# Patient Record
Sex: FEMALE | Race: WHITE | NOT HISPANIC OR LATINO | Employment: FULL TIME
[De-identification: names, ages, dates, MRNs, and addresses within clinical notes are randomized per-mention and may not be internally consistent; named-entity substitution may affect disease eponyms.]

---

## 2017-07-08 ENCOUNTER — HEALTH MAINTENANCE LETTER (OUTPATIENT)
Age: 42
End: 2017-07-08

## 2018-03-07 ENCOUNTER — TELEPHONE (OUTPATIENT)
Dept: OTHER | Facility: CLINIC | Age: 43
End: 2018-03-07

## 2018-03-22 NOTE — TELEPHONE ENCOUNTER
3/22/2018    Call Regarding Onboarding [employer] [facility]    Attempt 2    Message on voicemail     Comments: 0 DEP      Outreach   CC

## 2018-08-09 NOTE — TELEPHONE ENCOUNTER
8/9/2018    Call Regarding Onboarding [employer] [facility]    Attempt 3    Message on voicemail     Comments: 0 DEP      Outreach   CC

## 2020-02-10 ENCOUNTER — HEALTH MAINTENANCE LETTER (OUTPATIENT)
Age: 45
End: 2020-02-10

## 2020-05-04 ENCOUNTER — APPOINTMENT (OUTPATIENT)
Dept: LAB | Facility: CLINIC | Age: 45
End: 2020-05-04
Payer: COMMERCIAL

## 2020-05-04 ENCOUNTER — RESULTS ONLY (OUTPATIENT)
Dept: LAB | Age: 45
End: 2020-05-04

## 2020-05-05 LAB
COVID-19 SPIKE RBD ABY TITER: NORMAL
COVID-19 SPIKE RBD ABY: NEGATIVE

## 2020-11-14 ENCOUNTER — HEALTH MAINTENANCE LETTER (OUTPATIENT)
Age: 45
End: 2020-11-14

## 2021-03-28 ENCOUNTER — HEALTH MAINTENANCE LETTER (OUTPATIENT)
Age: 46
End: 2021-03-28

## 2021-09-12 ENCOUNTER — HEALTH MAINTENANCE LETTER (OUTPATIENT)
Age: 46
End: 2021-09-12

## 2021-12-13 ENCOUNTER — OFFICE VISIT (OUTPATIENT)
Dept: ORTHOPEDICS | Facility: CLINIC | Age: 46
End: 2021-12-13
Payer: COMMERCIAL

## 2021-12-13 ENCOUNTER — ANCILLARY PROCEDURE (OUTPATIENT)
Dept: GENERAL RADIOLOGY | Facility: CLINIC | Age: 46
End: 2021-12-13
Attending: FAMILY MEDICINE
Payer: COMMERCIAL

## 2021-12-13 DIAGNOSIS — M25.562 LEFT KNEE PAIN: Primary | ICD-10-CM

## 2021-12-13 DIAGNOSIS — M17.12 PRIMARY OSTEOARTHRITIS OF LEFT KNEE: Primary | ICD-10-CM

## 2021-12-13 PROCEDURE — 99203 OFFICE O/P NEW LOW 30 MIN: CPT | Performed by: FAMILY MEDICINE

## 2021-12-13 PROCEDURE — 73562 X-RAY EXAM OF KNEE 3: CPT | Mod: LT | Performed by: RADIOLOGY

## 2021-12-13 RX ORDER — DICLOFENAC SODIUM 75 MG/1
75 TABLET, DELAYED RELEASE ORAL 2 TIMES DAILY
Qty: 28 TABLET | Refills: 1 | Status: SHIPPED | OUTPATIENT
Start: 2021-12-13 | End: 2021-12-27

## 2021-12-13 NOTE — PATIENT INSTRUCTIONS
Use knee brace as needed for comfort  Take diclofenac up to twice daily with food as needed for pain  Avoid deep knee flexion exercises otherwise activity as tolerated  Perform home exercises as you are able.  Contact us if you would like to follow-up with physical therapy.    Leonidas Moss MD  [facility]  [address] [place] [id number]    Phone: [phone]  Fax: [phone]               Chondromalacia / Patellofemoral Pain    CHONDROMALACIA PATELLAE:  -Pain in the front of your knee due to increased pressure from the knee cap (patella)  -There are many causes including trauma, history of dislocation or subluxation of knee cap but most often it is due to an imbalance of the thigh muscles or weak core muscles which cause irregular tracking of your kneecap on your thigh bone.  -People whose feet pronate (roll in) increase the outward pulling on the kneecap as well    SIGNS AND SYMPTOMS:  -Diffuse knee pain that worsens with stairs, squatting, prolonged sitting, jumping, and similar movements.  -Pain may be achy or sharp  -Stiffness with prolonged sitting  -Occasionally, giving way of the knee, grinding or a catching sensation    TREATMENT:  -regular exercise (biking, swimming, or elliptical are good for cardio)  -weight lifting/plyometrics are helpful but remember to keep your knees behind your toes (don't bend knee more than 90degrees)  -regular core exercises (yoga and pilates)  -arch supports may help during exercise until hips stronger to prevent ankle pronation  *over the counter semi-rigid brands include power step arch supports may be purchased at specialty shoe stores, [employer] or [employer]  *custom made orthotics may be ordered by your physician if needed for prolonged treatment  -Stretching and strengthening therapy  -Ice 10-15 minutes after activity. (Ice cups for massage 5-7min)  -Ice and NSAIDs help decrease inflammation. A good  anti-inflammatory NSAID medication is Alleve (220mg). Take 1-2 tabs twice daily with food until pain improves or minimum of 14 days, then as needed for pain. (1-2 tabs twice daily dosing is for patients over 12 years old. Angy than 11 yo, check dose with doctor.)  -often component of hip weakness that leads to lower extremity (foot, ankle, shin, and knee) problems so a lot of focus will be on core strength and balance  - recommend yoga for core strengthening and stretching  -Perform exercises as instructed through handout or formal therapy if doing. Until then start with the following:  -Ankle strengthening  1) balance on one foot 1-2 min daily  2) once able to balance for 1 minute, start hip strengthening with 4 way motion with straight leg. Tie theraband around ankle and balance on other foot while doing15 reps each direction of straight leg  motion  3) arch raises- tighten bottom of foot and hold x 3-5 sec, repeat 5 times  4) ankle exercises (4 way with theraband)- 3 sets of 10-15 (fatigue) daily  -usually takes several weeks before pain free but longer before return to full activity without pain  --Once released to increase activity, BE PATIENT!    Return to activity guidelines include:    If it hurts, please avoid activity    Start gradually and build up, wait 24 hours before increase intensity and time    Runnin min on treadmill (or somewhere you can get home easily from if you have to stop), start walk 4 min/run 1 min and Repeat 3 times. If pain free for 48 hours, increase to walk 3 min/run 2 min. Continue to increase as such as pain allows. If you develop pain, back off to previous pain-free level and wait 1 week before increasing again.    Follow-up in 6 weeks if not improved or sooner if further concern.    If problem flares again after resolved, restart icing, and exercises.      Standing hamstring stretch: Put the heel of the leg on your injured side on a stool about 15 inches high. Keep your leg  straight. Lean forward, bending at the hips, until you feel a mild stretch in the back of your thigh. Make sure you don't roll your shoulders or bend at the waist when doing this or you will stretch your lower back instead of your leg. Hold the stretch for 15 to 30 seconds. Repeat 3 times.    Quadriceps stretch: Stand at an arm's length away from the wall with your injured side farthest from the wall. Facing straight ahead, brace yourself by keeping one hand against the wall. With your other hand, grasp the ankle on your injured side and pull your heel toward your buttocks. Don't arch or twist your back. Keep your knees together. Hold this stretch for 15 to 30 seconds.    Side-lying leg lift: Lie on your uninjured side. Tighten the front thigh muscles on your injured leg and lift that leg 8 to 10 inches (20 to 25 centimeters) away from the other leg. Keep the leg straight and lower it slowly. Do 2 sets of 15.    Quad sets: Sit on the floor with your injured leg straight and your other leg bent. Press the back of the knee of your injured leg against the floor by tightening the muscles on the top of your thigh. Hold this position 10 seconds. Relax. Do 2 sets of 15.  Straight leg raise: Lie on your back with your legs straight out in front of you. Bend the knee on your uninjured side and place the foot flat on the floor. Tighten the thigh muscle on your injured side and lift your leg about 8 inches off the floor. Keep your leg straight and your thigh muscle tight. Slowly lower your leg back down to the floor. Do 2 sets of 15.    Clam exercise: Lie on your uninjured side with your hips and knees bent and feet together. Slowly raise your top leg toward the ceiling while keeping your heels touching each other. Hold for 2 seconds and lower slowly. Do 2 sets of 15 repetitions.    Wall squat with a ball: Stand with your back, shoulders, and head against a wall. Look straight ahead. Keep your shoulders relaxed and your feet 3  feet (90 centimeters) from the wall and shoulder's width apart. Place a soccer or basketball-sized ball behind your back. Keeping your back against the wall, slowly squat down to a 45-degree angle. Your thighs will not yet be parallel to the floor. Hold this position for 10 seconds and then slowly slide back up the wall. Repeat 10 times. Build up to 2 sets of 15.    Knee stabilization: Wrap a piece of elastic tubing around the ankle of your uninjured leg. Tie a knot in the other end of the tubing and close it in a door at about ankle height.  Stand facing the door on the leg without tubing (your injured leg) and bend your knee slightly, keeping your thigh muscles tight. Stay in this position while you move the leg with the tubing (the uninjured leg) straight back behind you. Do 2 sets of 15.  Turn 90 degrees so the leg without tubing is closest to the door. Move the leg with tubing away from your body. Do 2 sets of 15.  Turn 90 degrees again so your back is to the door. Move the leg with tubing straight out in front of you. Do 2 sets of 15.  Turn your body 90 degrees again so the leg with tubing is closest to the door. Move the leg with tubing across your body. Do 2 sets of 15.  Hold onto a chair if you need help balancing. This exercise can be made more challenging by standing on a firm pillow or foam mat while you move the leg with tubing.    Resisted terminal knee extension: Make a loop with a piece of elastic tubing by tying a knot in both ends. Close the knot in a door at knee height. Step into the loop with your injured leg so the tubing is around the back of your knee. Lift the other foot off the ground and hold onto a chair for balance, if needed. Bend the knee with tubing about 45 degrees. Slowly straighten your leg, keeping your thigh muscle tight as you do this. Repeat 15 times. Do 2 sets of 15. If you need an easier way to do this, stand on both legs for better support while you do the  exercise.    Standing calf stretch: Stand facing a wall with your hands on the wall at about eye level. Keep your injured leg back with your heel on the floor. Keep the other leg forward with the knee bent. Turn your back foot slightly inward (as if you were pigeon-toed). Slowly lean into the wall until you feel a stretch in the back of your calf. Hold the stretch for 15 to 30 seconds. Return to the starting position. Repeat 3 times. Do this exercise several times each day.    Step-up: Stand with the foot of your injured leg on a support 3 to 5 inches (8 to 13 centimeters) high --like a small step or block of wood. Keep your other foot flat on the floor. Shift your weight onto the injured leg on the support. Straighten your injured leg as the other leg comes off the floor. Return to the starting position by bending your injured leg and slowly lowering your uninjured leg back to the floor. Do 2 sets of 15.    Iliotibial band stretch, side-bending: Cross one leg in front of the other leg and lean in the opposite direction from the front leg. Reach the arm on the side of the back leg over your head while you do this. Hold this position for 15 to 30 seconds. Return to the starting position. Repeat 3 times and then switch legs and repeat the exercise.  Developed by [employer].  Published by [employer].  Copyright  2014 [employer] and/or one of its subsidiaries. All rights reserved.

## 2021-12-13 NOTE — LETTER
12/13/2021      RE: Alma Delia Hsieh  [address] [facility] [address]  [facility] [address]         [facility]  SPORTS & ORTHOPEDIC CLINIC VISIT     Dec 13, 2021        ASSESSMENT & PLAN    46-year-old with patellofemoral pain secondary to osteoarthritis of the patellofemoral joint and lateral patellar subluxation.    Reviewed imaging and assessment with patient in detail  Use knee brace as needed for comfort  Take diclofenac up to twice daily with food as needed for pain  Avoid deep knee flexion exercises otherwise activity as tolerated  Perform home exercises as you are able.  Contact us if you would like to follow-up with physical therapy.  May consider steroid injection if pain fails to improve.    Leonidas Moss MD  [facility] [place]    -----  Chief Complaint   Patient presents with     Left Knee - Pain       SUBJECTIVE  Alma Delia Hsieh is a/an 46 year old female who is seen as a self referral for evaluation of  Left knee pain.     The patient is seen by themselves.  The patient is Right handed    Onset: 12/10/21. Patient describes injury as tripping over a rug and landed on her knees and landed on carpet. When falling she felt a rush of liquid in her knee and now has an audible pop.   Location of Pain: left lateral knee  Worsened by: Going down the stairs   Better with: Ice, Ibuprofen   Treatments tried: ice and ibuprofen and knee brace   Associated symptoms: swelling and weakness    Orthopedic/Surgical history: NO  Social History/Occupation: Works in cystic fibrosis       REVIEW OF SYSTEMS:    Do you have fever, chills, weight loss? No    Do you have any vision problems? No    Do you have any chest pain or edema? No    Do you have any shortness of breath or wheezing?  No    Do you have stomach problems? No    Do you have any numbness or focal weakness? No    Do you have diabetes? No    Do you have problems with bleeding or clotting? No    Do you  have an rashes or other skin lesions? No    OBJECTIVE:  There were no vitals taken for this visit.     Patient is alert, No acute distress, pleasant and conversational.    Gait: nonantalgic. Normal heel toe gait.    left knee:   Skin intact. No erythema or ecchymosis.  Mild effusion.  No soft tissue swelling    AROM: Zero to approximately 135  with crepitus noted    Palpation: No medial or lateral facet joint tenderness.  No posterior medial or posterior lateral joint line tenderness     Special Tests:  Negative bounce test, negative forced flexion and negative Real's.  No ligamentous laxity or pain with valgus or varus stress.  Negative Lachman's, Anterior Drawer and Posterior Drawer     Full Isometric quad strength, extensor mechanism in place     Neurovascularly intact in the lower extremity    Hip and Ankle with full AROM and nontender      RADIOLOGY:    Three-view x-rays of the left knee performed today and reviewed independently demonstrating no acute fracture or dislocation.  Minimal narrowing of the medial or lateral compartments.  Moderate narrowing of the lateral patellofemoral compartment with osteophytes and lateral subluxation of the patella.  See EMR for formal radiology report.         Leonidas Moss MD

## 2021-12-13 NOTE — PROGRESS NOTES
[facility] SUR[facility]ERY C[facility]  SPORTS & ORTHOPEDIC CLINIC VISIT     Dec 13, 2021        ASSESSMENT & PLAN    46-year-old with patellofemoral pain secondary to osteoarthritis of the patellofemoral joint and lateral patellar subluxation.    Reviewed imaging and assessment with patient in detail  Use knee brace as needed for comfort  Take diclofenac up to twice daily with food as needed for pain  Avoid deep knee flexion exercises otherwise activity as tolerated  Perform home exercises as you are able.  Contact us if you would like to follow-up with physical therapy.  May consider steroid injection if pain fails to improve.    Leonidas Moss MD  [facility]    -----  Chief Complaint   Patient presents with     Left Knee - Pain       SUBJECTIVE  Alma Delia Hsieh is a/an 46 year old female who is seen as a self referral for evaluation of  Left knee pain.     The patient is seen by themselves.  The patient is Right handed    Onset: 12/10/21. Patient describes injury as tripping over a rug and landed on her knees and landed on carpet. When falling she felt a rush of liquid in her knee and now has an audible pop.   Location of Pain: left lateral knee  Worsened by: Going down the stairs   Better with: Ice, Ibuprofen   Treatments tried: ice and ibuprofen and knee brace   Associated symptoms: swelling and weakness    Orthopedic/Surgical history: NO  Social History/Occupation: Works in cystic fibrosis       REVIEW OF SYSTEMS:    Do you have fever, chills, weight loss? No    Do you have any vision problems? No    Do you have any chest pain or edema? No    Do you have any shortness of breath or wheezing?  No    Do you have stomach problems? No    Do you have any numbness or focal weakness? No    Do you have diabetes? No    Do you have problems with bleeding or clotting? No    Do you have an rashes or other skin lesions? No    OBJECTIVE:  There were no vitals taken for this visit.      Patient is alert, No acute distress, pleasant and conversational.    Gait: nonantalgic. Normal heel toe gait.    left knee:   Skin intact. No erythema or ecchymosis.  Mild effusion.  No soft tissue swelling    AROM: Zero to approximately 135  with crepitus noted    Palpation: No medial or lateral facet joint tenderness.  No posterior medial or posterior lateral joint line tenderness     Special Tests:  Negative bounce test, negative forced flexion and negative Real's.  No ligamentous laxity or pain with valgus or varus stress.  Negative Lachman's, Anterior Drawer and Posterior Drawer     Full Isometric quad strength, extensor mechanism in place     Neurovascularly intact in the lower extremity    Hip and Ankle with full AROM and nontender      RADIOLOGY:    Three-view x-rays of the left knee performed today and reviewed independently demonstrating no acute fracture or dislocation.  Minimal narrowing of the medial or lateral compartments.  Moderate narrowing of the lateral patellofemoral compartment with osteophytes and lateral subluxation of the patella.  See EMR for formal radiology report.

## 2022-01-03 ENCOUNTER — ANCILLARY PROCEDURE (OUTPATIENT)
Dept: GENERAL RADIOLOGY | Facility: CLINIC | Age: 47
End: 2022-01-03
Attending: FAMILY MEDICINE
Payer: COMMERCIAL

## 2022-01-03 ENCOUNTER — OFFICE VISIT (OUTPATIENT)
Dept: ORTHOPEDICS | Facility: CLINIC | Age: 47
End: 2022-01-03
Payer: COMMERCIAL

## 2022-01-03 DIAGNOSIS — G89.29 CHRONIC PAIN OF RIGHT KNEE: ICD-10-CM

## 2022-01-03 DIAGNOSIS — M25.561 CHRONIC PAIN OF RIGHT KNEE: ICD-10-CM

## 2022-01-03 DIAGNOSIS — M25.562 ACUTE PAIN OF LEFT KNEE: Primary | ICD-10-CM

## 2022-01-03 PROCEDURE — 73562 X-RAY EXAM OF KNEE 3: CPT | Mod: LT | Performed by: RADIOLOGY

## 2022-01-03 PROCEDURE — 99213 OFFICE O/P EST LOW 20 MIN: CPT | Mod: 25 | Performed by: FAMILY MEDICINE

## 2022-01-03 PROCEDURE — 20610 DRAIN/INJ JOINT/BURSA W/O US: CPT | Mod: LT | Performed by: FAMILY MEDICINE

## 2022-01-03 PROCEDURE — 73562 X-RAY EXAM OF KNEE 3: CPT | Mod: RT | Performed by: RADIOLOGY

## 2022-01-03 RX ADMIN — TRIAMCINOLONE ACETONIDE 40 MG: 40 INJECTION, SUSPENSION INTRA-ARTICULAR; INTRAMUSCULAR at 11:04

## 2022-01-03 RX ADMIN — LIDOCAINE HYDROCHLORIDE 4 ML: 10 INJECTION, SOLUTION EPIDURAL; INFILTRATION; INTRACAUDAL; PERINEURAL at 11:04

## 2022-01-03 NOTE — NURSING NOTE
[address] [address]  Dept: [phone]  ______________________________________________________________________________    Patient: Alma Delia Hsieh   : 1975   MRN: [id number]   January 3, 2022    INVASIVE PROCEDURE SAFETY CHECKLIST    Date: January 3, 2022   Procedure:L knee CSI   Patient Name: Alma Delia Hsieh  MRN: [id number]  YOB: 1975    Action: Complete sections as appropriate. Any discrepancy results in a HARD COPY until resolved.     PRE PROCEDURE:  Patient ID verified with 2 identifiers (name and  or MRN): Yes  Procedure and site verified with patient/designee (when able): Yes  Accurate consent documentation in medical record: Yes  H&P (or appropriate assessment) documented in medical record: Yes  H&P must be up to 20 days prior to procedure and updates within 24 hours of procedure as applicable: NA  Relevant diagnostic and radiology test results appropriately labeled and displayed as applicable: Yes  Procedure site(s) marked with provider initials: NA    TIMEOUT:  Time-Out performed immediately prior to starting procedure, including verbal and active participation of all team members addressing the following:Yes  * Correct patient identify  * Confirmed that the correct side and site are marked  * An accurate procedure consent form  * Agreement on the procedure to be done  * Correct patient position  * Relevant images and results are properly labeled and appropriately displayed  * The need to administer antibiotics or fluids for irrigation purposes during the procedure as applicable   * Safety precautions based on patient history or medication use    DURING PROCEDURE: Verification of correct person, site, and procedures any time the responsibility for care of the patient is transferred to another member of the care team.       Prior to injection, verified patient identity using patient's name and date of birth.  Due  to injection administration, patient instructed to remain in clinic for 15 minutes  afterwards, and to report any adverse reaction to me immediately.    Joint injection was performed.      Drug Amount Wasted:  Yes: 1 mg/ml   Vial/Syringe: Single dose vial  Expiration Date:  9/1/25      Barbi Tellez ATC  January 3, 2022

## 2022-01-03 NOTE — LETTER
1/3/2022      RE: Alma Delia Hsieh  [address]  [facility] [address]         [facility]  SPORTS & ORTHOPEDIC CLINIC VISIT     Marquise 3, 2022        ASSESSMENT & PLAN    45 yo with suspected loose body in left knee. Mild djd of right knee    Reviewed imaging and assessment with patient in detail  Discussed options for treatment. Since sx improving some, plan to try steroid injection. See note for details  If no improvement or if it continues to recur, would recommended surgical eval  Has also had chronic pain in right knee and demonstrated OA in medial right knee  Reviewed tx including topical and prn nsaids, bracing, PT, injections    Leonidas Moss MD  [facility]TS [facility]IC[facility] [place]    Face-to-face time:15 minutes  Record review time: 3Minutes  Documentation time: 2Minutes  Total time: 20 Minutes independent of procedure on day of visit    -----  Chief Complaint   Patient presents with     Left Knee - Follow Up       SUBJECTIVE  Alma Delia Hsieh is a/an 46 year old female who is seen for follow up of left knee pain. On New Years Day was standing in the kitchen and took a step back and heard and felt a crunching noise. Was unable to WB, fainted from the pain. Most of the pain is on the lateral knee.     The patient is seen by themselves.    Date of injury: 12/10/21  Date of Last Visit: 12/13/21   Symptoms: NA  Worsened by: Walking,   Better with: Flexion,   Treatment to date: ice, casting/splinting/bracing and diclofenac   Associated symptoms: swelling. Locking/catching        REVIEW OF SYSTEMS:    See HPI     OBJECTIVE:  There were no vitals taken for this visit.     Patient is alert, No acute distress, pleasant and conversational.    Gait: antalgic    left knee:   Skin intact. No erythema or ecchymosis.  + effusion no soft tissue swelling.    AROM: Zero to approximately 135  pain with terminal extension    Palpation: ttp over the lateral facet and  anterior lateral joint line  No ttp over medial joint line    Special Tests:    No ligamentous laxity or pain with valgus or varus stress.  Negative Lachman's, Anterior Drawer and Posterior Drawer     Full Isometric quad strength, extensor mechanism in place     Neurovascularly intact in the lower extremity    Hip and Ankle with full AROM and nontender      RADIOLOGY:    3 view xrays of left knee performed and reviewed independently demonstrating suspected loose body lateral to the patella seen on sunrise view. See EMR for formal radiology report.          Large Joint Injection/Arthocentesis: L knee joint    Date/Time: 1/3/2022 11:04 AM  Performed by: Leonidas Moss MD  Authorized by: Leonidas Moss MD     Indications:  Pain and osteoarthritis  Needle Size:  22 G  Guidance: landmark guided    Approach:  Anterolateral  Location:  Knee      Medications:  40 mg triamcinolone 40 MG/ML; 4 mL lidocaine (PF) 1 %  Outcome:  Tolerated well, no immediate complications  Procedure discussed: discussed risks, benefits, and alternatives    Consent Given by:  Patient  Timeout: timeout called immediately prior to procedure    Prep: patient was prepped and draped in usual sterile fashion       There were no complications. The patient tolerated the procedure well. There was minimal bleeding.   The patient was instructed to ice the knee upon leaving clinic and refrain from overuse over the next 2 days.   The patient was instructed to go to the emergency room with any unusual pain, swelling, or redness occurred in the injected area.     Leonidas Moss MD

## 2022-01-03 NOTE — PROGRESS NOTES
[facility]  SPORTS & ORTHOPEDIC CLINIC VISIT     Marquise 3, 2022        ASSESSMENT & PLAN    47 yo with suspected loose body in left knee. Mild djd of right knee    Reviewed imaging and assessment with patient in detail  Discussed options for treatment. Since sx improving some, plan to try steroid injection. See note for details  If no improvement or if it continues to recur, would recommended surgical eval  Has also had chronic pain in right knee and demonstrated OA in medial right knee  Reviewed tx including topical and prn nsaids, bracing, PT, injections    Leonidas Moss MD  [facility] SPOR[facility]IC[facility] CLINIC [place]    Face-to-face time:15 minutes  Record review time: 3Minutes  Documentation time: 2Minutes  Total time: 20 Minutes independent of procedure on day of visit    -----  Chief Complaint   Patient presents with     Left Knee - Follow Up       SUBJECTIVE  Alma Delia Hsieh is a/an 46 year old female who is seen for follow up of left knee pain. On New Years Day was standing in the kitchen and took a step back and heard and felt a crunching noise. Was unable to WB, fainted from the pain. Most of the pain is on the lateral knee.     The patient is seen by themselves.    Date of injury: 12/10/21  Date of Last Visit: 12/13/21   Symptoms: NA  Worsened by: Walking,   Better with: Flexion,   Treatment to date: ice, casting/splinting/bracing and diclofenac   Associated symptoms: swelling. Locking/catching        REVIEW OF SYSTEMS:    See HPI     OBJECTIVE:  There were no vitals taken for this visit.     Patient is alert, No acute distress, pleasant and conversational.    Gait: antalgic    left knee:   Skin intact. No erythema or ecchymosis.  + effusion no soft tissue swelling.    AROM: Zero to approximately 135  pain with terminal extension    Palpation: ttp over the lateral facet and anterior lateral joint line  No ttp over medial joint line    Special Tests:    No ligamentous  laxity or pain with valgus or varus stress.  Negative Lachman's, Anterior Drawer and Posterior Drawer     Full Isometric quad strength, extensor mechanism in place     Neurovascularly intact in the lower extremity    Hip and Ankle with full AROM and nontender      RADIOLOGY:    3 view xrays of left knee performed and reviewed independently demonstrating suspected loose body lateral to the patella seen on sunrise view. See EMR for formal radiology report.          Large Joint Injection/Arthocentesis: L knee joint    Date/Time: 1/3/2022 11:04 AM  Performed by: Leonidas Moss MD  Authorized by: Leonidas Moss MD     Indications:  Pain and osteoarthritis  Needle Size:  22 G  Guidance: landmark guided    Approach:  Anterolateral  Location:  Knee      Medications:  40 mg triamcinolone 40 MG/ML; 4 mL lidocaine (PF) 1 %  Outcome:  Tolerated well, no immediate complications  Procedure discussed: discussed risks, benefits, and alternatives    Consent Given by:  Patient  Timeout: timeout called immediately prior to procedure    Prep: patient was prepped and draped in usual sterile fashion       There were no complications. The patient tolerated the procedure well. There was minimal bleeding.   The patient was instructed to ice the knee upon leaving clinic and refrain from overuse over the next 2 days.   The patient was instructed to go to the emergency room with any unusual pain, swelling, or redness occurred in the injected area.     Leonidas Moss MD

## 2022-01-09 RX ORDER — TRIAMCINOLONE ACETONIDE 40 MG/ML
40 INJECTION, SUSPENSION INTRA-ARTICULAR; INTRAMUSCULAR
Status: DISCONTINUED | OUTPATIENT
Start: 2022-01-03 | End: 2024-10-01

## 2022-01-09 RX ORDER — LIDOCAINE HYDROCHLORIDE 10 MG/ML
4 INJECTION, SOLUTION EPIDURAL; INFILTRATION; INTRACAUDAL; PERINEURAL
Status: DISCONTINUED | OUTPATIENT
Start: 2022-01-03 | End: 2024-10-01

## 2022-04-24 ENCOUNTER — HEALTH MAINTENANCE LETTER (OUTPATIENT)
Age: 47
End: 2022-04-24

## 2022-11-19 ENCOUNTER — HEALTH MAINTENANCE LETTER (OUTPATIENT)
Age: 47
End: 2022-11-19

## 2023-06-01 ENCOUNTER — HEALTH MAINTENANCE LETTER (OUTPATIENT)
Age: 48
End: 2023-06-01

## 2024-01-27 ENCOUNTER — HEALTH MAINTENANCE LETTER (OUTPATIENT)
Age: 49
End: 2024-01-27

## 2024-03-08 ENCOUNTER — ANCILLARY PROCEDURE (OUTPATIENT)
Dept: MAMMOGRAPHY | Facility: CLINIC | Age: 49
End: 2024-03-08
Attending: PHYSICIAN ASSISTANT
Payer: COMMERCIAL

## 2024-03-08 DIAGNOSIS — Z12.31 VISIT FOR SCREENING MAMMOGRAM: ICD-10-CM

## 2024-03-08 PROCEDURE — 77063 BREAST TOMOSYNTHESIS BI: CPT | Mod: GC | Performed by: RADIOLOGY

## 2024-03-08 PROCEDURE — 77067 SCR MAMMO BI INCL CAD: CPT | Mod: GC | Performed by: RADIOLOGY

## 2024-03-22 ENCOUNTER — OFFICE VISIT (OUTPATIENT)
Dept: FAMILY MEDICINE | Facility: CLINIC | Age: 49
End: 2024-03-22
Payer: COMMERCIAL

## 2024-03-22 VITALS
OXYGEN SATURATION: 100 % | HEART RATE: 78 BPM | SYSTOLIC BLOOD PRESSURE: 117 MMHG | HEIGHT: 69 IN | RESPIRATION RATE: 16 BRPM | WEIGHT: 161 LBS | TEMPERATURE: 98.4 F | DIASTOLIC BLOOD PRESSURE: 72 MMHG | BODY MASS INDEX: 23.85 KG/M2

## 2024-03-22 DIAGNOSIS — F17.210 CIGARETTE SMOKER: Primary | ICD-10-CM

## 2024-03-22 DIAGNOSIS — Z13.29 SCREENING FOR THYROID DISORDER: ICD-10-CM

## 2024-03-22 DIAGNOSIS — R79.89 LOW VITAMIN D LEVEL: ICD-10-CM

## 2024-03-22 DIAGNOSIS — Z13.1 SCREENING FOR DIABETES MELLITUS: ICD-10-CM

## 2024-03-22 DIAGNOSIS — Z13.0 SCREENING FOR DEFICIENCY ANEMIA: ICD-10-CM

## 2024-03-22 DIAGNOSIS — H57.02 ANISOCORIA: ICD-10-CM

## 2024-03-22 DIAGNOSIS — Z23 NEED FOR VACCINATION AGAINST HEPATITIS B VIRUS: ICD-10-CM

## 2024-03-22 DIAGNOSIS — Z13.220 LIPID SCREENING: ICD-10-CM

## 2024-03-22 DIAGNOSIS — E56.9 VITAMIN DEFICIENCY: ICD-10-CM

## 2024-03-22 PROCEDURE — 99204 OFFICE O/P NEW MOD 45 MIN: CPT | Performed by: FAMILY MEDICINE

## 2024-03-22 RX ORDER — VARENICLINE TARTRATE 1 MG/1
1 TABLET, FILM COATED ORAL 2 TIMES DAILY
Qty: 60 TABLET | Refills: 5 | Status: SHIPPED | OUTPATIENT
Start: 2024-04-20

## 2024-03-22 RX ORDER — VARENICLINE TARTRATE 0.5 (11)-1
KIT ORAL
Qty: 53 TABLET | Refills: 0 | Status: SHIPPED | OUTPATIENT
Start: 2024-03-22 | End: 2024-10-01

## 2024-03-22 RX ORDER — TIRZEPATIDE 10 MG/.5ML
7.5 INJECTION, SOLUTION SUBCUTANEOUS
COMMUNITY
Start: 2024-03-19

## 2024-03-22 ASSESSMENT — PAIN SCALES - GENERAL: PAINLEVEL: NO PAIN (0)

## 2024-03-22 NOTE — PROGRESS NOTES
Assessment/Plan.    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  I think ophthalmology consultation is a good next step.  Patient has already scheduled this.    Concern for carotid dissection.  Intensity and timing of neck discomfort would be atypical for this.  Reviewed with patient that given that potential Dereck syndrome symptoms are lifelong rather than new, carotid dissection seems unlikely.    Cigarette smoker.  Patient interested in restarting Chantix.  Chantix prescribed.  Encouraged patient to continue use until she has gone 1 month without a cigarette.  Reviewed risk of nausea, vivid dreams.    Nutrition monitoring labs.  Ordered per patient request.    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  She recently noticed that right pupil seems less reactive to light.  Also notes chronic dry eye on the right.  Patient met with outside ophthalmologist.  She was diagnosed with dry eye.  Symptoms have been refractory to lubricating drops.    Recent neck discomfort.  Given possible Dereck syndrome, she is worried about carotid dissection.  The neck discomfort has occurred on 4 occasions within the past year.  No recent dizziness.    Cigarette smoker.  1/3 pack/day.  About 25 years of use.  Tried Chantix in the past, but caused exacerbation of pre-existing sleepwalking.  No sleepwalking in past  "2 years.    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  Only 2 menses in lifetime.  Patient is hesitant to restart estrogen because she is a smoker.    Obesity.  Started tirzepatide in August 2022.  Down 65 pounds.  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  Weaning dose; currently at 5 mg weekly.    Exam  /72 (BP Location: Right arm, Patient Position: Sitting, Cuff Size: Adult Regular)   Pulse 78   Temp 98.4  F (36.9  C) (Temporal)   Resp 16   Ht 1.756 m (5' 9.13\")   Wt 73 kg (161 lb)   LMP  (LMP Unknown)   SpO2 100%   BMI 23.68 kg/m      No obvious ptosis.  Right pupil perhaps slightly smaller than left.  Symmetric facial movements.  No carotid bruit.  "

## 2024-03-22 NOTE — PROGRESS NOTES
"  {PROVIDER CHARTING PREFERENCE:763859}    Violet Flannery is a 48 year old, presenting for the following health issues:  vision concerns, Neck Pain, and Medication Request      3/22/2024     1:02 PM   Additional Questions   Roomed by Sandy BARBER         3/22/2024     1:02 PM   Patient Reported Additional Medications   Patient reports taking the following new medications Mounjaro     History of Present Illness       Reason for visit:  Acute Right sided neck pain lasting only minutes at a time on multiple occasions  Symptom onset:  More than a month  Symptoms include:  Right sided neck pain;  Symptom intensity:  Moderate  Symptom progression:  Staying the same  Had these symptoms before:  Yes  Has tried/received treatment for these symptoms:  No  What makes it worse:  No  What makes it better:  No    She eats 2-3 servings of fruits and vegetables daily.She consumes 1 sweetened beverage(s) daily.She exercises with enough effort to increase her heart rate 30 to 60 minutes per day.  She exercises with enough effort to increase her heart rate 3 or less days per week.   She is taking medications regularly.       {MA/LPN/RN Pre-Provider Visit Orders- hCG/UA/Strep (Optional):684262}  {SUPERLIST (Optional):316260}  {additonal problems for provider to add (Optional):813566}    {ROS Picklists (Optional):049312}      Objective    /72 (BP Location: Right arm, Patient Position: Sitting, Cuff Size: Adult Regular)   Pulse 78   Temp 98.4  F (36.9  C) (Temporal)   Resp 16   Ht 1.756 m (5' 9.13\")   Wt 73 kg (161 lb)   LMP  (LMP Unknown)   SpO2 100%   BMI 23.68 kg/m    Body mass index is 23.68 kg/m .  Physical Exam   {Exam List (Optional):854753}    {Diagnostic Test Results (Optional):541382}        Signed Electronically by: Javon Head MD  {Email feedback regarding this note to [email or web address]   :504952}  "

## 2024-03-23 PROBLEM — M17.12 PRIMARY OSTEOARTHRITIS OF LEFT KNEE: Status: ACTIVE | Noted: 2024-03-23

## 2024-04-06 ENCOUNTER — LAB (OUTPATIENT)
Dept: LAB | Facility: CLINIC | Age: 49
End: 2024-04-06
Payer: COMMERCIAL

## 2024-04-06 DIAGNOSIS — Z13.1 SCREENING FOR DIABETES MELLITUS: ICD-10-CM

## 2024-04-06 DIAGNOSIS — Z23 NEED FOR VACCINATION AGAINST HEPATITIS B VIRUS: ICD-10-CM

## 2024-04-06 DIAGNOSIS — E56.9 VITAMIN DEFICIENCY: ICD-10-CM

## 2024-04-06 DIAGNOSIS — Z13.29 SCREENING FOR THYROID DISORDER: ICD-10-CM

## 2024-04-06 DIAGNOSIS — Z13.220 LIPID SCREENING: ICD-10-CM

## 2024-04-06 DIAGNOSIS — Z13.0 SCREENING FOR DEFICIENCY ANEMIA: ICD-10-CM

## 2024-04-06 DIAGNOSIS — R79.89 LOW VITAMIN D LEVEL: ICD-10-CM

## 2024-04-06 LAB
CHOLEST SERPL-MCNC: 163 MG/DL
ERYTHROCYTE [DISTWIDTH] IN BLOOD BY AUTOMATED COUNT: 13.2 % (ref 10–15)
FASTING STATUS PATIENT QL REPORTED: NORMAL
FASTING STATUS PATIENT QL REPORTED: NORMAL
FERRITIN SERPL-MCNC: 274 NG/ML (ref 6–175)
FOLATE SERPL-MCNC: 25.8 NG/ML (ref 4.6–34.8)
GLUCOSE SERPL-MCNC: 92 MG/DL (ref 70–99)
HBV SURFACE AB SERPL IA-ACNC: 325 M[IU]/ML
HBV SURFACE AB SERPL IA-ACNC: REACTIVE M[IU]/ML
HCT VFR BLD AUTO: 42.2 % (ref 35–47)
HDLC SERPL-MCNC: 57 MG/DL
HGB BLD-MCNC: 14 G/DL (ref 11.7–15.7)
LDLC SERPL CALC-MCNC: 93 MG/DL
MCH RBC QN AUTO: 31 PG (ref 26.5–33)
MCHC RBC AUTO-ENTMCNC: 33.2 G/DL (ref 31.5–36.5)
MCV RBC AUTO: 94 FL (ref 78–100)
NONHDLC SERPL-MCNC: 106 MG/DL
PLATELET # BLD AUTO: 208 10E3/UL (ref 150–450)
RBC # BLD AUTO: 4.51 10E6/UL (ref 3.8–5.2)
TRIGL SERPL-MCNC: 64 MG/DL
TSH SERPL DL<=0.005 MIU/L-ACNC: 3.17 UIU/ML (ref 0.3–4.2)
VIT B12 SERPL-MCNC: 1120 PG/ML (ref 232–1245)
VIT D+METAB SERPL-MCNC: 55 NG/ML (ref 20–50)
WBC # BLD AUTO: 5.8 10E3/UL (ref 4–11)

## 2024-04-06 PROCEDURE — 84590 ASSAY OF VITAMIN A: CPT | Mod: 90 | Performed by: PATHOLOGY

## 2024-04-06 PROCEDURE — 85027 COMPLETE CBC AUTOMATED: CPT | Performed by: PATHOLOGY

## 2024-04-06 PROCEDURE — 86706 HEP B SURFACE ANTIBODY: CPT | Performed by: FAMILY MEDICINE

## 2024-04-06 PROCEDURE — 36415 COLL VENOUS BLD VENIPUNCTURE: CPT | Performed by: PATHOLOGY

## 2024-04-06 PROCEDURE — 82607 VITAMIN B-12: CPT | Performed by: FAMILY MEDICINE

## 2024-04-06 PROCEDURE — 99000 SPECIMEN HANDLING OFFICE-LAB: CPT | Performed by: PATHOLOGY

## 2024-04-06 PROCEDURE — 82728 ASSAY OF FERRITIN: CPT | Performed by: PATHOLOGY

## 2024-04-06 PROCEDURE — 80061 LIPID PANEL: CPT | Performed by: PATHOLOGY

## 2024-04-06 PROCEDURE — 82746 ASSAY OF FOLIC ACID SERUM: CPT | Performed by: FAMILY MEDICINE

## 2024-04-06 PROCEDURE — 82947 ASSAY GLUCOSE BLOOD QUANT: CPT | Performed by: PATHOLOGY

## 2024-04-06 PROCEDURE — 82306 VITAMIN D 25 HYDROXY: CPT | Performed by: FAMILY MEDICINE

## 2024-04-06 PROCEDURE — 84443 ASSAY THYROID STIM HORMONE: CPT | Performed by: PATHOLOGY

## 2024-04-11 LAB
ANNOTATION COMMENT IMP: NORMAL
RETINYL PALMITATE SERPL-MCNC: <0.02 MG/L
VIT A SERPL-MCNC: 0.64 MG/L

## 2024-04-16 ENCOUNTER — PATIENT OUTREACH (OUTPATIENT)
Dept: ONCOLOGY | Facility: CLINIC | Age: 49
End: 2024-04-16
Payer: COMMERCIAL

## 2024-04-16 NOTE — PROGRESS NOTES
Hematology referral reviewed for [facility], see below.    Referral reason: referral received from PCP for elevated ferritin with FH per below          Clinical question entered by referring provider or through order transcription: assess for hereditary hemochromatosis     Referral received via: Internal referral by [facility]    Current abnormal labs: Available in Chart Review    Outreach: Call not placed to patient regarding referral.    Plan: Triage instructions updated and sent to NPS for completion.

## 2024-04-17 ENCOUNTER — LAB (OUTPATIENT)
Dept: LAB | Facility: CLINIC | Age: 49
End: 2024-04-17
Payer: COMMERCIAL

## 2024-04-17 DIAGNOSIS — R79.89 ELEVATED FERRITIN: ICD-10-CM

## 2024-04-17 LAB
ALBUMIN SERPL BCG-MCNC: 4.2 G/DL (ref 3.5–5.2)
ALP SERPL-CCNC: 64 U/L (ref 40–150)
ALT SERPL W P-5'-P-CCNC: 12 U/L (ref 0–50)
ANION GAP SERPL CALCULATED.3IONS-SCNC: 9 MMOL/L (ref 7–15)
AST SERPL W P-5'-P-CCNC: 18 U/L (ref 0–45)
BILIRUB SERPL-MCNC: 0.2 MG/DL
BUN SERPL-MCNC: 16.7 MG/DL (ref 6–20)
CALCIUM SERPL-MCNC: 9 MG/DL (ref 8.6–10)
CHLORIDE SERPL-SCNC: 104 MMOL/L (ref 98–107)
CREAT SERPL-MCNC: 0.89 MG/DL (ref 0.51–0.95)
DEPRECATED HCO3 PLAS-SCNC: 28 MMOL/L (ref 22–29)
EGFRCR SERPLBLD CKD-EPI 2021: 81 ML/MIN/1.73M2
GLUCOSE SERPL-MCNC: 71 MG/DL (ref 70–99)
IRON BINDING CAPACITY (ROCHE): 252 UG/DL (ref 240–430)
IRON SATN MFR SERPL: 9 % (ref 15–46)
IRON SERPL-MCNC: 23 UG/DL (ref 37–145)
POTASSIUM SERPL-SCNC: 4.4 MMOL/L (ref 3.4–5.3)
PROT SERPL-MCNC: 7.1 G/DL (ref 6.4–8.3)
SODIUM SERPL-SCNC: 141 MMOL/L (ref 135–145)

## 2024-04-17 PROCEDURE — 83550 IRON BINDING TEST: CPT | Performed by: PATHOLOGY

## 2024-04-17 PROCEDURE — 80053 COMPREHEN METABOLIC PANEL: CPT | Performed by: PATHOLOGY

## 2024-04-17 PROCEDURE — 83540 ASSAY OF IRON: CPT | Performed by: PATHOLOGY

## 2024-04-17 PROCEDURE — 36415 COLL VENOUS BLD VENIPUNCTURE: CPT | Performed by: PATHOLOGY

## 2024-04-21 PROBLEM — E61.1 IRON DEFICIENCY: Status: ACTIVE | Noted: 2024-04-21

## 2024-04-24 ENCOUNTER — OFFICE VISIT (OUTPATIENT)
Dept: OPTOMETRY | Facility: CLINIC | Age: 49
End: 2024-04-24
Payer: COMMERCIAL

## 2024-04-24 DIAGNOSIS — H04.129 DRY EYE: ICD-10-CM

## 2024-04-24 DIAGNOSIS — H52.13 HIGH MYOPIA, BOTH EYES: Primary | ICD-10-CM

## 2024-04-24 ASSESSMENT — REFRACTION_MANIFEST
OD_CYLINDER: SPHERE
OD_SPHERE: -9.00
OS_AXIS: 085
OS_SPHERE: -8.25
OS_CYLINDER: +0.50

## 2024-04-24 ASSESSMENT — CUP TO DISC RATIO
OS_RATIO: 0.35
OD_RATIO: 0.35

## 2024-04-24 ASSESSMENT — VISUAL ACUITY
OD_CC: 20/20-2
OS_CC: 20/20-2
CORRECTION_TYPE: CONTACTS
METHOD: SNELLEN - LINEAR

## 2024-04-24 ASSESSMENT — REFRACTION_CURRENTRX
OS_SPHERE: -6.50
OS_DIAMETER: 14.2
OD_BRAND: MYDAY
OD_BASECURVE: 8.4
OS_BRAND: MYDAY
OS_BASECURVE: 8.4
OD_DIAMETER: 14.2
OD_SPHERE: -8.00

## 2024-04-24 ASSESSMENT — CONF VISUAL FIELD
OD_INFERIOR_NASAL_RESTRICTION: 0
OD_SUPERIOR_NASAL_RESTRICTION: 0
OS_INFERIOR_TEMPORAL_RESTRICTION: 0
OD_INFERIOR_TEMPORAL_RESTRICTION: 0
OS_INFERIOR_NASAL_RESTRICTION: 0
OD_NORMAL: 1
OS_NORMAL: 1
OS_SUPERIOR_TEMPORAL_RESTRICTION: 0
OD_SUPERIOR_TEMPORAL_RESTRICTION: 0
OS_SUPERIOR_NASAL_RESTRICTION: 0

## 2024-04-24 ASSESSMENT — REFRACTION_WEARINGRX
OS_CYLINDER: SPHERE
OS_SPHERE: -7.75
OD_SPHERE: -7.75
OD_CYLINDER: SPHERE

## 2024-04-24 ASSESSMENT — TONOMETRY
OS_IOP_MMHG: 12
IOP_METHOD: ICARE
OD_IOP_MMHG: 13

## 2024-04-24 ASSESSMENT — SLIT LAMP EXAM - LIDS
COMMENTS: MGD
COMMENTS: MGD

## 2024-04-24 NOTE — PATIENT INSTRUCTIONS
Gel drops: (Thicker consistency, may blur vision slightly. Can be used during the day or at night)  -Refresh Celluvisc  -Refresh Liquigel  -Refresh Optive Gel Drops  -Systane Gel Drops  -Blink Gel Drops    Ointments: (Very thick, these moisturize the best but can blur vision. Best used right before bedtime)  -Refresh PM  -Systane Nighttime  -Genteal Gel      Warm compresses: Use 1-2x/day for 5-10 minutes over closed eyelids  -Aurora mask  -Tranquileyes beaded mask  -Mibo heating pad  -[place] REST & RELIEF Eye Mask (Hot or Cold)  -I-RELIEF Therapy Mask  -OcuTherm Essentials Kit    You can also use a warm wet washcloth - however this frequently loses heat quickly and can dry your skin out a bit so we recommend any of the above re-usable beaded/gel eyemasks      To purchase these products you can look over-the-counter at drugstores or purchase online at the following websites:  -www.[email or web address]  -[email or web address]

## 2024-04-24 NOTE — PROGRESS NOTES
A/P  1.) High Myopia OU  -Largely asymptomatic for presbyopia in glasses and CL's  -Doing well in current CL's, okay to continue  -Dilated ocular health unremarkable OU    2.) Dry eye right eye>left eye  -Symptomatic for glare/blur, no improvement with AT  -Start gel/bentley at night right eye, warm compress. Continue plug right eye    Monitor 1-2 years comprehensive, sooner prn

## 2024-06-15 ENCOUNTER — HEALTH MAINTENANCE LETTER (OUTPATIENT)
Age: 49
End: 2024-06-15

## 2024-09-04 ENCOUNTER — OFFICE VISIT (OUTPATIENT)
Dept: OPTOMETRY | Facility: CLINIC | Age: 49
End: 2024-09-04
Payer: COMMERCIAL

## 2024-09-04 DIAGNOSIS — H04.129 DRY EYE: Primary | ICD-10-CM

## 2024-09-04 ASSESSMENT — VISUAL ACUITY
OD_CC: 20/20
CORRECTION_TYPE: CONTACTS
OS_CC: 20/25
METHOD: SNELLEN - LINEAR
OD_CC+: -2
OS_CC+: +1

## 2024-09-04 NOTE — NURSING NOTE
Chief Complaints and History of Present Illnesses   Patient presents with    Dry Eye(s) Follow-Up     Pt here for punctal plug replacement     Chief Complaint(s) and History of Present Illness(es)       Dry Eye(s) Follow-Up              Comments: Pt here for punctal plug replacement              Comments    Pt here for permanent punctal plug RLL. Vision is largely unchanged since last exam. No new concerns.     KHURRAM Kirkpatrick on 9/4/2024 at 11:20 AM

## 2024-09-04 NOTE — PROGRESS NOTES
Plug replacement only right eye. See procedure notes. Silicone plug right eye, no complications    Follow-up as previous

## 2024-10-01 ENCOUNTER — LAB (OUTPATIENT)
Dept: LAB | Facility: CLINIC | Age: 49
End: 2024-10-01
Attending: ADVANCED PRACTICE MIDWIFE
Payer: COMMERCIAL

## 2024-10-01 ENCOUNTER — OFFICE VISIT (OUTPATIENT)
Dept: OBGYN | Facility: CLINIC | Age: 49
End: 2024-10-01
Attending: ADVANCED PRACTICE MIDWIFE
Payer: COMMERCIAL

## 2024-10-01 VITALS
DIASTOLIC BLOOD PRESSURE: 73 MMHG | HEART RATE: 77 BPM | SYSTOLIC BLOOD PRESSURE: 104 MMHG | BODY MASS INDEX: 22.39 KG/M2 | WEIGHT: 156.4 LBS | HEIGHT: 70 IN

## 2024-10-01 DIAGNOSIS — E28.39 PREMATURE OVARIAN FAILURE: ICD-10-CM

## 2024-10-01 DIAGNOSIS — M17.12 PRIMARY OSTEOARTHRITIS OF LEFT KNEE: ICD-10-CM

## 2024-10-01 DIAGNOSIS — Z00.00 ANNUAL PHYSICAL EXAM: Primary | ICD-10-CM

## 2024-10-01 DIAGNOSIS — Q97.0 TRISOMY X SYNDROME: ICD-10-CM

## 2024-10-01 LAB
ESTRADIOL SERPL-MCNC: 8 PG/ML
SHBG SERPL-SCNC: 132 NMOL/L (ref 30–135)

## 2024-10-01 PROCEDURE — 82670 ASSAY OF TOTAL ESTRADIOL: CPT

## 2024-10-01 PROCEDURE — 84403 ASSAY OF TOTAL TESTOSTERONE: CPT

## 2024-10-01 PROCEDURE — G0145 SCR C/V CYTO,THINLAYER,RESCR: HCPCS | Performed by: ADVANCED PRACTICE MIDWIFE

## 2024-10-01 PROCEDURE — 84270 ASSAY OF SEX HORMONE GLOBUL: CPT

## 2024-10-01 PROCEDURE — 99386 PREV VISIT NEW AGE 40-64: CPT | Performed by: ADVANCED PRACTICE MIDWIFE

## 2024-10-01 PROCEDURE — 36415 COLL VENOUS BLD VENIPUNCTURE: CPT

## 2024-10-01 PROCEDURE — 99213 OFFICE O/P EST LOW 20 MIN: CPT | Performed by: ADVANCED PRACTICE MIDWIFE

## 2024-10-01 PROCEDURE — 87624 HPV HI-RISK TYP POOLED RSLT: CPT | Performed by: ADVANCED PRACTICE MIDWIFE

## 2024-10-01 RX ORDER — FERROUS SULFATE 325(65) MG
325 TABLET ORAL
COMMUNITY

## 2024-10-01 RX ORDER — PROGESTERONE 100 MG/1
100 CAPSULE ORAL DAILY
Qty: 90 CAPSULE | Refills: 3 | Status: SHIPPED | OUTPATIENT
Start: 2024-10-01

## 2024-10-01 RX ORDER — ESTRADIOL 0.05 MG/D
1 PATCH, EXTENDED RELEASE TRANSDERMAL
Qty: 24 PATCH | Refills: 3 | Status: SHIPPED | OUTPATIENT
Start: 2024-10-03

## 2024-10-01 ASSESSMENT — PAIN SCALES - GENERAL: PAINLEVEL: NO PAIN (0)

## 2024-10-01 NOTE — PROGRESS NOTES
Subjective:  Alma Delia Hsieh is an 49 year old, , who requests an evaluation of menopause symptoms and annual preventative health exam.       Concerns today include:   Pap due  Has not been on hormones x 3 years (premature ovarian failure), pt used nuvaring for many years since   Recent significant weight loss, now at ideal weight    [place]ecille/menopause symptoms include:   Vaginal dryness, Vaginal pain/dyspareunia, and Low libido  Interested in testing estrogen and testosterones levels and is interested in finding a provider to prescribe testosterone if appropriate    Gynecologic History  No LMP recorded. (Menstrual status: Amenorrhea).       Last Pap smear:   History of abnormal Pap smear: No      Obstetric History  OB History    Para Term  AB Living   0 0 0 0 0 0   SAB IAB Ectopic Multiple Live Births   0 0 0 0 0        Health Maintenance  Colonoscopy recommended--ordered  Dexa--ordered  (d/t premature ovarian function)  Mammogram had one     Labs:  TSH   Date Value Ref Range Status   2024 3.17 0.30 - 4.20 uIU/mL Final   2013 2.29 0.4 - 5.0 mU/L Final     Lab Results   Component Value Date    CHOL 163 2024    CHOL 170 2013     Lab Results   Component Value Date    HDL 57 2024    HDL 55 2013     Lab Results   Component Value Date    LDL 93 2024    LDL 84 2013     Lab Results   Component Value Date    TRIG 64 2024    TRIG 153 2013     Lab Results   Component Value Date    CHOLHDLRATIO 3.1 2013         Past Medical History  Past Medical History:   Diagnosis Date    Premature ovarian failure 08/15/2011    Tobacco use disorder 10/24/2007    quit 2024    Trisomy X syndrome 08/15/2011       Past Surgical History  Past Surgical History:   Procedure Laterality Date    C INCISION OF HYMEN  2001       Medications  Current Outpatient Medications   Medication Sig Dispense Refill    calcium-vitamin D-vitamin K (VIACTIV) 500-500-40  MG-UNT-MCG CHEW Take 1 tablet by mouth 2 times daily.      estradiol (VIVELLE-DOT) 0.05 MG/24HR bi-weekly patch Place 1 patch over 96 hours onto the skin twice a week. 24 patch 3    ferrous sulfate (FEROSUL) 325 (65 Fe) MG tablet Take 325 mg by mouth daily (with breakfast).      progesterone (PROMETRIUM) 100 MG capsule Take 1 capsule (100 mg) by mouth daily. 90 capsule 3    varenicline (CHANTIX) 1 MG tablet Take 1 tablet (1 mg) by mouth 2 times daily 60 tablet 5    ZEPBOUND 10 MG/0.5ML prefilled pen Inject 7.5 mg subcutaneously every 7 days.      diclofenac (VOLTAREN) 75 MG EC tablet Take 1 tablet (75 mg) by mouth 2 times daily for 14 days 28 tablet 1     No current facility-administered medications for this visit.       Allergies     Allergies   Allergen Reactions    Adhesive Tape     Latex Hives       Social History  Social History     Socioeconomic History    Marital status: Single     Spouse name: Not on file    Number of children: Not on file    Years of education: Not on file    Highest education level: Not on file   Occupational History    Not on file   Tobacco Use    Smoking status: Former     Current packs/day: 0.00     Average packs/day: 0.5 packs/day for 6.0 years (3.0 ttl pk-yrs)     Types: Cigarettes     Quit date:      Years since quittin.7    Smokeless tobacco: Never    Tobacco comments:     Chantix  quit 8-3-24   Vaping Use    Vaping status: Never Used   Substance and Sexual Activity    Alcohol use: Yes     Comment: occas    Drug use: No    Sexual activity: Yes   Other Topics Concern    Parent/sibling w/ CABG, MI or angioplasty before 65F 55M? 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[employer]        Updated 3/22/2024     Social Determinants of Health     Financial Resource Strain: Not on file   Food Insecurity: Not on file   Transportation Needs: Not on file   Physical Activity: Not on file   Stress:  "Not on file   Social Connections: Not on file   Interpersonal Safety: Low Risk  (3/22/2024)    Interpersonal Safety     Do you feel physically and emotionally safe where you currently live?: Yes     Within the past 12 months, have you been hit, slapped, kicked or otherwise physically hurt by someone?: No     Within the past 12 months, have you been humiliated or emotionally abused in other ways by your partner or ex-partner?: No   Housing Stability: Not on file     Lives: w partner  Works: [employer] center  Exercise: yes  EtOH: occas  Tobacco: no smoker.   Drugs: Denies illicit drug use.   Concerns about home situation/relationship: none, getting !  Abuse: Denies concern for physical, mental, emotional, verbal abuse    Family History  Family History   Problem Relation Age of Onset    Breast Cancer Mother     No Known Problems Brother     Heart Disease Maternal Grandmother     Hypertension Paternal Grandmother     Osteoporosis Paternal Grandmother     Glaucoma Paternal Grandfather     Macular Degeneration Paternal Grandfather     Cancer Paternal Grandfather         Skin    Cerebrovascular Disease Paternal Grandfather      No family history of breast, uterine, ovarian or colon cancer yes--mother.    Review of Systems      Objective  EXAM:  Blood pressure 104/73, pulse 77, height 1.78 m (5' 10.08\"), weight 70.9 kg (156 lb 6.4 oz), not currently breastfeeding. Body mass index is 22.39 kg/m .  General - pleasant female in no acute distress.  Skin - no suspicious lesions or rashes  EENT-  PERRLA, euthyroid without palpable nodules  Neck - supple without lymphadenopathy.  Lungs - clear to auscultation bilaterally.  Heart - regular rate and rhythm without murmur.  Abdomen - soft, nontender, nondistended, no masses or organomegaly noted.  Musculoskeletal - no gross deformities.  Neurological - normal strength, sensation, and mental status.    Breast Exam:  Breast: Without visible skin changes. No dimpling or lesions " seen.   Breasts supple, non-tender with palpation, no dominant mass, nodularity, or nipple discharge noted bilaterally. Axillary nodes negative.     Pelvic Exam:  EG/BUS: Normal genital architecture without lesions, erythema or abnormal secretions Bartholin's, Urethra, [place]'s beck. Vag atrophy   Urethral meatus: normal   Urethra: no masses, tenderness, or scarring   Bladder: no masses or tenderness   Vagina: moist, pink, rugae with creamy, white, and odorless  secretions--vaginal atrophy noted  Cervix: no lesions and pink, moist, closed, without lesion or CMT  \    ASSESSMENT:  Alma Delia Hsieh is an 49 year old, , who requests an evaluation of menopause symptoms and annual preventative health exam.    ICD-10-CM    1. Annual physical exam  Z00.00 Colonoscopy Screening  Referral     Obtaining, preparing and conveyance of cervical or vaginal smear to laboratory.     HPV and Gynecologic Cytology Panel - Recommended Age 30 - 65 Years     Gynecologic Cytology (PAP)      2. Premature ovarian failure  E28.39 estradiol (VIVELLE-DOT) 0.05 MG/24HR bi-weekly patch     progesterone (PROMETRIUM) 100 MG capsule     Estradiol     Testosterone Free and Total     DX Bone Density      3. Primary osteoarthritis of left knee  M17.12       4. Trisomy X syndrome  Q97.0           PLAN:  Orders Placed This Encounter   Procedures    Obtaining, preparing and conveyance of cervical or vaginal smear to laboratory.    DX Bone Density    Estradiol    HPV and Gynecologic Cytology Panel - Recommended Age 30 - 65 Years    Colonoscopy Screening  Referral    Gynecologic Cytology (PAP)    Testosterone Free and Total       -Reviewed risk and benefits of initiating systemic HT for bothersome VMS. -Discussed risk versus benefit with patient in initiating systemic HT if age of 60 years or 10 years since LMP. Alma Delia Hsieh is an 49 year old with No LMP recorded. (Menstrual status: Amenorrhea)..    -Patient does not have any  absolute contraindications and requests to initiate treatment today using: transdermal estradiol patch including nightly oral progesterone.    -Reviewed use of vaginal lubrication/moisturizer for GSM.      Age 40-64 Annual Preventative Exam    Immunizations: Tdap q10 years  NEEDS, did not get today    Additional teaching done at this visit regarding exercise and weight/diet.    Return to clinic in 1-2 mos with [facility] or [place] (has seen them in the past) to discuss MHT. Discussed vaginal estrogen as option if needed  CAROL Regan CNM

## 2024-10-01 NOTE — LETTER
10/1/2024       RE: Alma Delia Hsieh  [address]  [facility] [address]     Dear Colleague,    Thank you for referring your patient, Alma Delia Hsieh, to the [facility] [place] at [facility]. Please see a copy of my visit note below.        Subjective:  Alma Delia Hsieh is an 49 year old, , who requests an evaluation of menopause symptoms and annual preventative health exam.       Concerns today include:   Pap due  Has not been on hormones x 3 years (premature ovarian failure), pt used nuvaring for many years since   Recent significant weight loss, now at ideal weight    Cecille/menopause symptoms include:   Vaginal dryness, Vaginal pain/dyspareunia, and Low libido  Interested in testing estrogen and testosterones levels and is interested in finding a provider to prescribe testosterone if appropriate    Gynecologic History  No LMP recorded. (Menstrual status: Amenorrhea).       Last Pap smear:   History of abnormal Pap smear: No      Obstetric History  OB History    Para Term  AB Living   0 0 0 0 0 0   SAB IAB Ectopic Multiple Live Births   0 0 0 0 0        Health Maintenance  Colonoscopy recommended--ordered  Dexa--ordered  (d/t premature ovarian function)  Mammogram had one     Labs:  TSH   Date Value Ref Range Status   2024 3.17 0.30 - 4.20 uIU/mL Final   2013 2.29 0.4 - 5.0 mU/L Final     Lab Results   Component Value Date    CHOL 163 2024    CHOL 170 2013     Lab Results   Component Value Date    HDL 57 2024    HDL 55 2013     Lab Results   Component Value Date    LDL 93 2024    LDL 84 2013     Lab Results   Component Value Date    TRIG 64 2024    TRIG 153 2013     Lab Results   Component Value Date    CHOLHDLRATIO 3.1 2013         Past Medical History  Past Medical History:   Diagnosis Date     Premature ovarian failure 08/15/2011      Tobacco use disorder 10/24/2007    quit 2024     Trisomy X syndrome 08/15/2011       Past Surgical History  Past Surgical History:   Procedure Laterality Date     C INCISION OF HYMEN         Medications  Current Outpatient Medications   Medication Sig Dispense Refill     calcium-vitamin D-vitamin K (VIACTIV) 500-500-40 MG-UNT-MCG CHEW Take 1 tablet by mouth 2 times daily.       estradiol (VIVELLE-DOT) 0.05 MG/24HR bi-weekly patch Place 1 patch over 96 hours onto the skin twice a week. 24 patch 3     ferrous sulfate (FEROSUL) 325 (65 Fe) MG tablet Take 325 mg by mouth daily (with breakfast).       progesterone (PROMETRIUM) 100 MG capsule Take 1 capsule (100 mg) by mouth daily. 90 capsule 3     varenicline (CHANTIX) 1 MG tablet Take 1 tablet (1 mg) by mouth 2 times daily 60 tablet 5     ZEPBOUND 10 MG/0.5ML prefilled pen Inject 7.5 mg subcutaneously every 7 days.       diclofenac (VOLTAREN) 75 MG EC tablet Take 1 tablet (75 mg) by mouth 2 times daily for 14 days 28 tablet 1     No current facility-administered medications for this visit.       Allergies     Allergies   Allergen Reactions     Adhesive Tape      Latex Hives       Social History  Social History     Socioeconomic History     Marital status: Single     Spouse name: Not on file     Number of children: Not on file     Years of education: Not on file     Highest education level: Not on file   Occupational History     Not on file   Tobacco Use     Smoking status: Former     Current packs/day: 0.00     Average packs/day: 0.5 packs/day for 6.0 years (3.0 ttl pk-yrs)     Types: Cigarettes     Quit date:      Years since quittin.7     Smokeless tobacco: Never     Tobacco comments:     Chantix  quit 8-3-24   Vaping Use     Vaping status: Never Used   Substance and Sexual Activity     Alcohol use: Yes     Comment: occas     Drug use: No     Sexual activity: Yes   Other Topics Concern     Parent/sibling w/ CABG, MI or angioplasty before 65F 55M? No  "  Social History Narrative    -Grew up in [place]    -Lives with boyfriend    -No kids    -Works in scheduling at [employer]        Updated 3/22/2024     Social Determinants of Health     Financial Resource Strain: Not on file   Food Insecurity: Not on file   Transportation Needs: Not on file   Physical Activity: Not on file   Stress: Not on file   Social Connections: Not on file   Interpersonal Safety: Low Risk  (3/22/2024)    Interpersonal Safety      Do you feel physically and emotionally safe where you currently live?: Yes      Within the past 12 months, have you been hit, slapped, kicked or otherwise physically hurt by someone?: No      Within the past 12 months, have you been humiliated or emotionally abused in other ways by your partner or ex-partner?: No   Housing Stability: Not on file     Lives: w partner  Works: [employer]  Exercise: yes  EtOH: occas  Tobacco: no smoker.   Drugs: Denies illicit drug use.   Concerns about home situation/relationship: none, getting !  Abuse: Denies concern for physical, mental, emotional, verbal abuse    Family History  Family History   Problem Relation Age of Onset     Breast Cancer Mother      No Known Problems Brother      Heart Disease Maternal Grandmother      Hypertension Paternal Grandmother      Osteoporosis Paternal Grandmother      Glaucoma Paternal Grandfather      Macular Degeneration Paternal Grandfather      Cancer Paternal Grandfather         Skin     Cerebrovascular Disease Paternal Grandfather      No family history of breast, uterine, ovarian or colon cancer yes--mother.    Review of Systems      Objective  EXAM:  Blood pressure 104/73, pulse 77, height 1.78 m (5' 10.08\"), weight 70.9 kg (156 lb 6.4 oz), not currently breastfeeding. Body mass index is 22.39 kg/m .  General - pleasant female in no acute distress.  Skin - no suspicious lesions or rashes  EENT-  PERRLA, euthyroid without palpable nodules  Neck - supple without " lymphadenopathy.  Lungs - clear to auscultation bilaterally.  Heart - regular rate and rhythm without murmur.  Abdomen - soft, nontender, nondistended, no masses or organomegaly noted.  Musculoskeletal - no gross deformities.  Neurological - normal strength, sensation, and mental status.    Breast Exam:  Breast: Without visible skin changes. No dimpling or lesions seen.   Breasts supple, non-tender with palpation, no dominant mass, nodularity, or nipple discharge noted bilaterally. Axillary nodes negative.     Pelvic Exam:  EG/BUS: Normal genital architecture without lesions, erythema or abnormal secretions Bartholin's, Urethra, Floydale's beck. Vag atrophy   Urethral meatus: normal   Urethra: no masses, tenderness, or scarring   Bladder: no masses or tenderness   Vagina: moist, pink, rugae with creamy, white, and odorless  secretions--vaginal atrophy noted  Cervix: no lesions and pink, moist, closed, without lesion or CMT  \    ASSESSMENT:  Alma Delia Hsieh is an 49 year old, , who requests an evaluation of menopause symptoms and annual preventative health exam.    ICD-10-CM    1. Annual physical exam  Z00.00 Colonoscopy Screening  Referral     Obtaining, preparing and conveyance of cervical or vaginal smear to laboratory.     HPV and Gynecologic Cytology Panel - Recommended Age 30 - 65 Years     Gynecologic Cytology (PAP)      2. Premature ovarian failure  E28.39 estradiol (VIVELLE-DOT) 0.05 MG/24HR bi-weekly patch     progesterone (PROMETRIUM) 100 MG capsule     Estradiol     Testosterone Free and Total     DX Bone Density      3. Primary osteoarthritis of left knee  M17.12       4. Trisomy X syndrome  Q97.0           PLAN:  Orders Placed This Encounter   Procedures     Obtaining, preparing and conveyance of cervical or vaginal smear to laboratory.     DX Bone Density     Estradiol     HPV and Gynecologic Cytology Panel - Recommended Age 30 - 65 Years     Colonoscopy Screening  Referral      Gynecologic Cytology (PAP)     Testosterone Free and Total       -Reviewed risk and benefits of initiating systemic HT for bothersome VMS. -Discussed risk versus benefit with patient in initiating systemic HT if age of 60 years or 10 years since LMP. Alma Delia Hsieh is an 49 year old with No LMP recorded. (Menstrual status: Amenorrhea)..    -Patient does not have any absolute contraindications and requests to initiate treatment today using: transdermal estradiol patch including nightly oral progesterone.    -Reviewed use of vaginal lubrication/moisturizer for GSM.      Age 40-64 Annual Preventative Exam    Immunizations: Tdap q10 years  NEEDS, did not get today    Additional teaching done at this visit regarding exercise and weight/diet.    Return to clinic in 1-2 mos with [facility] or [place] (has seen them in the past) to discuss MHT. Discussed vaginal estrogen as option if needed  CAROL Regan CNM          Again, thank you for allowing me to participate in the care of your patient.      Sincerely,    CAROL Regan CNM

## 2024-10-02 LAB
HPV HR 12 DNA CVX QL NAA+PROBE: NEGATIVE
HPV16 DNA CVX QL NAA+PROBE: NEGATIVE
HPV18 DNA CVX QL NAA+PROBE: NEGATIVE
HUMAN PAPILLOMA VIRUS FINAL DIAGNOSIS: NORMAL

## 2024-10-03 LAB
TESTOST FREE SERPL-MCNC: 0.1 NG/DL
TESTOST SERPL-MCNC: 15 NG/DL (ref 8–60)

## 2024-10-07 LAB
BKR AP ASSOCIATED HPV REPORT: NORMAL
BKR LAB AP GYN ADEQUACY: NORMAL
BKR LAB AP GYN INTERPRETATION: NORMAL
BKR LAB AP PREVIOUS ABNORMAL: NORMAL
PATH REPORT.COMMENTS IMP SPEC: NORMAL
PATH REPORT.COMMENTS IMP SPEC: NORMAL
PATH REPORT.RELEVANT HX SPEC: NORMAL

## 2024-10-17 DIAGNOSIS — F17.210 CIGARETTE SMOKER: ICD-10-CM

## 2024-10-17 RX ORDER — VARENICLINE TARTRATE 1 MG/1
1 TABLET, FILM COATED ORAL 2 TIMES DAILY
Qty: 60 TABLET | Refills: 5 | Status: SHIPPED | OUTPATIENT
Start: 2024-10-17

## 2024-12-12 ENCOUNTER — HOSPITAL ENCOUNTER (OUTPATIENT)
Facility: AMBULATORY SURGERY CENTER | Age: 49
End: 2024-12-12
Attending: STUDENT IN AN ORGANIZED HEALTH CARE EDUCATION/TRAINING PROGRAM
Payer: COMMERCIAL

## 2024-12-12 ENCOUNTER — TELEPHONE (OUTPATIENT)
Dept: GASTROENTEROLOGY | Facility: CLINIC | Age: 49
End: 2024-12-12
Payer: COMMERCIAL

## 2024-12-12 NOTE — TELEPHONE ENCOUNTER
"Endoscopy Scheduling Screen    Have you had any respiratory illness or flu-like symptoms in the last 10 days?  No    What is your communication preference for Instructions and/or Bowel Prep?   MyChart    What insurance is in the chart?  Other:  [employer]    Ordering/Referring Provider:     YAMILETH SAINI      (If ordering provider performs procedure, schedule with ordering provider unless otherwise instructed. )    BMI: Estimated body mass index is 22.39 kg/m  as calculated from the following:    Height as of 10/1/24: 1.78 m (5' 10.08\").    Weight as of 10/1/24: 70.9 kg (156 lb 6.4 oz).     Sedation Ordered  moderate sedation.   If patient BMI > 50 do not schedule in [facility].    If patient BMI > 45 do not schedule at [facility].    Are you taking methadone or Suboxone?  NO, No RN review required.    Have you been diagnosed and are being treated for severe PTSD or severe anxiety?  NO, No RN review required.    Are you taking any prescription medications for pain 3 or more times per week?   NO, No RN review required.    Do you have a history of malignant hyperthermia?  No    (Females) Are you currently pregnant?   No     Have you been diagnosed or told you have pulmonary hypertension?   No    Do you have an LVAD?  No    Have you been told you have moderate to severe sleep apnea?  No.    Have you been told you have COPD, asthma, or any other lung disease?  No    Do you have any heart conditions?  No     Have you ever had or are you waiting for an organ transplant?  No. Continue scheduling, no site restrictions.    Have you had a stroke or transient ischemic attack (TIA aka \"mini stroke\" in the last 6 months?   No    Have you been diagnosed with or been told you have cirrhosis of the liver?   No.    Are you currently on dialysis?   No    Do you need assistance transferring?   No    BMI: Estimated body mass index is 22.39 kg/m  as calculated from the following:    Height as of 10/1/24: 1.78 m (5' 10.08\").    Weight as of " 10/1/24: 70.9 kg (156 lb 6.4 oz).     Is patients BMI > 40 and scheduling location [facility]?  No    Do you take an injectable or oral medication for weight loss or diabetes (excluding insulin)?  No    Do you take the medication Naltrexone?  No    Do you take blood thinners?  No       Prep   Are you currently on dialysis or do you have chronic kidney disease?  No    Do you have a diagnosis of diabetes?  No    Do you have a diagnosis of cystic fibrosis (CF)?  No    On a regular basis do you go 3 -5 days between bowel movements?  No    BMI > 40?  No    Preferred Pharmacy:    [address]     Final Scheduling Details     Procedure scheduled  Colonoscopy    Surgeon:  EVITA     Date of procedure:  2/18     Pre-OP / PAC:   No - Not required for this site.    Location  [facility] - [facility] - Per order.    Sedation   Moderate Sedation - Per order.      Patient Reminders:   You will receive a call from a Nurse to review instructions and health history.  This assessment must be completed prior to your procedure.  Failure to complete the Nurse assessment may result in the procedure being cancelled.      On the day of your procedure, please designate an adult(s) who can drive you home stay with you for the next 24 hours. The medicines used in the exam will make you sleepy. You will not be able to drive.      You cannot take public transportation, ride share services, or non-medical taxi service without a responsible caregiver.  Medical transport services are allowed with the requirement that a responsible caregiver will receive you at your destination.  We require that drivers and caregivers are confirmed prior to your procedure.

## 2024-12-27 ENCOUNTER — ANCILLARY PROCEDURE (OUTPATIENT)
Dept: BONE DENSITY | Facility: CLINIC | Age: 49
End: 2024-12-27
Attending: ADVANCED PRACTICE MIDWIFE
Payer: COMMERCIAL

## 2024-12-27 DIAGNOSIS — E28.39 PREMATURE OVARIAN FAILURE: ICD-10-CM

## 2024-12-27 PROCEDURE — 77080 DXA BONE DENSITY AXIAL: CPT

## 2024-12-30 ENCOUNTER — TELEPHONE (OUTPATIENT)
Dept: OBGYN | Facility: CLINIC | Age: 49
End: 2024-12-30
Payer: COMMERCIAL

## 2024-12-30 NOTE — TELEPHONE ENCOUNTER
----- Message from Naomi Berman Page sent at 12/29/2024  2:20 PM CST -----  Hello,    Can you please call patient to offer her a visit with Dr Hernandez given her new diagnosis of osteoporosis?  I let her know you would be calling to schedule this visit.    Thanks!  Naomi

## 2025-02-03 ENCOUNTER — TELEPHONE (OUTPATIENT)
Dept: GASTROENTEROLOGY | Facility: CLINIC | Age: 50
End: 2025-02-03
Payer: COMMERCIAL

## 2025-02-03 NOTE — TELEPHONE ENCOUNTER
Caller:     Reason for Reschedule/Cancellation (please be detailed, any staff messages or encounters to note?):       Did you cancel or rescheduled an [facility] procedure? No.    Is screening questionnaire older than 3 months from the reschedule date.   If Yes, please complete screening questionnaire. No    Prior to reschedule please review:  Ordering Provider:     YAMILETH SAINI     Sedation Determined: CS  Does patient have any ASC Exclusions, please identify?:     Notes on Cancelled Procedure:  Procedure: Lower Endoscopy [Colonoscopy]   Date: 2/18  Location: [facility]; [address], [address], [place] [address]   Surgeon: EVITA    Rescheduled: Yes,   Procedure: Lower Endoscopy [Colonoscopy]    Date: 3/28   Location: [facility]; [address], [address], [place] [address]   Surgeon: VISHNU   Sedation Level Scheduled  CS ,  Reason for Sedation Level ORDER   Instructions updated and sent: Y     Does patient need PAC or Pre -Op Rescheduled? : N

## 2025-02-04 ENCOUNTER — OFFICE VISIT (OUTPATIENT)
Dept: INTERNAL MEDICINE | Facility: CLINIC | Age: 50
End: 2025-02-04
Payer: COMMERCIAL

## 2025-02-04 VITALS
BODY MASS INDEX: 27.53 KG/M2 | TEMPERATURE: 97.1 F | OXYGEN SATURATION: 99 % | SYSTOLIC BLOOD PRESSURE: 118 MMHG | HEART RATE: 69 BPM | DIASTOLIC BLOOD PRESSURE: 62 MMHG | WEIGHT: 192.3 LBS | HEIGHT: 70 IN | RESPIRATION RATE: 16 BRPM

## 2025-02-04 DIAGNOSIS — M81.8 IDIOPATHIC OSTEOPOROSIS: Primary | ICD-10-CM

## 2025-02-04 DIAGNOSIS — E28.39 PREMATURE OVARIAN FAILURE: ICD-10-CM

## 2025-02-04 DIAGNOSIS — Q97.0 TRISOMY X SYNDROME: ICD-10-CM

## 2025-02-04 LAB
ERYTHROCYTE [DISTWIDTH] IN BLOOD BY AUTOMATED COUNT: 13 % (ref 10–15)
HCT VFR BLD AUTO: 41.9 % (ref 35–47)
HGB BLD-MCNC: 13.9 G/DL (ref 11.7–15.7)
MCH RBC QN AUTO: 30.8 PG (ref 26.5–33)
MCHC RBC AUTO-ENTMCNC: 33.2 G/DL (ref 31.5–36.5)
MCV RBC AUTO: 93 FL (ref 78–100)
PLATELET # BLD AUTO: 239 10E3/UL (ref 150–450)
RBC # BLD AUTO: 4.51 10E6/UL (ref 3.8–5.2)
TOTAL PROTEIN SERUM FOR ELP: 6.7 G/DL (ref 6.4–8.3)
WBC # BLD AUTO: 7 10E3/UL (ref 4–11)

## 2025-02-04 PROCEDURE — 84080 ASSAY ALKALINE PHOSPHATASES: CPT | Mod: 90 | Performed by: INTERNAL MEDICINE

## 2025-02-04 PROCEDURE — 85027 COMPLETE CBC AUTOMATED: CPT | Performed by: INTERNAL MEDICINE

## 2025-02-04 PROCEDURE — 83521 IG LIGHT CHAINS FREE EACH: CPT | Performed by: INTERNAL MEDICINE

## 2025-02-04 PROCEDURE — 84443 ASSAY THYROID STIM HORMONE: CPT | Performed by: INTERNAL MEDICINE

## 2025-02-04 PROCEDURE — 80053 COMPREHEN METABOLIC PANEL: CPT | Performed by: INTERNAL MEDICINE

## 2025-02-04 PROCEDURE — 36415 COLL VENOUS BLD VENIPUNCTURE: CPT | Performed by: INTERNAL MEDICINE

## 2025-02-04 PROCEDURE — 99000 SPECIMEN HANDLING OFFICE-LAB: CPT | Performed by: INTERNAL MEDICINE

## 2025-02-04 PROCEDURE — 83970 ASSAY OF PARATHORMONE: CPT | Performed by: INTERNAL MEDICINE

## 2025-02-04 PROCEDURE — 84155 ASSAY OF PROTEIN SERUM: CPT | Mod: 59 | Performed by: INTERNAL MEDICINE

## 2025-02-04 PROCEDURE — 84165 PROTEIN E-PHORESIS SERUM: CPT | Performed by: PATHOLOGY

## 2025-02-04 PROCEDURE — 83735 ASSAY OF MAGNESIUM: CPT | Performed by: INTERNAL MEDICINE

## 2025-02-04 PROCEDURE — 82784 ASSAY IGA/IGD/IGG/IGM EACH: CPT | Performed by: INTERNAL MEDICINE

## 2025-02-04 PROCEDURE — 99214 OFFICE O/P EST MOD 30 MIN: CPT | Performed by: INTERNAL MEDICINE

## 2025-02-04 PROCEDURE — 82306 VITAMIN D 25 HYDROXY: CPT | Performed by: INTERNAL MEDICINE

## 2025-02-04 PROCEDURE — 86364 TISS TRNSGLTMNASE EA IG CLAS: CPT | Performed by: INTERNAL MEDICINE

## 2025-02-04 NOTE — PATIENT INSTRUCTIONS
Treatment Options discussed:   Bisphosphonates  Oral - Alendronate (Fosamax) once weekly for 2-5 years  IV - Zoledronic acid (Reclast) once a year for 3 years total     -Discussed that studies have shown that alendronate for 5 years will protect the bones for an additional 5 years nearly as much as being onalendronate for 10 years straight (only a slight increase in asymptomatic vertebral fractures, no increase in symptomatic fractures).     -discussed studies have shown that three years of zolendronic acid protects forthe following 3 years as much as being on zolendronic acid for 6 years straight      GI side effects of the oral bisphosphonates include reflux, esophagitis (inflammation of the esophagus) and ulcers. You should takethe medication first thing in the morning, on an empty stomach with a full glass of water and wait 60 minutes to eat.     Flu-like symptoms can occur within the first 24-72 hours of your first infusion of the IVbisphosphonate. This includes low-grade fever, muscle and joint pains. Ibuprofen and/or Tylenol can help these symptoms. Low calcium levels can also occur with the IV bisphosphonates.     Osteonecrosis of the jaw(ONJ) has been rarely associated with bisphosphonate use for osteoporosis.The risk is approximately 1/1700-1/100,000, with development most likely related to invasive dental procedures (extractions, dental implants) and poordental hygiene. Be sure to continue regular dental visits and alert me and/or your dentist for any issues. If you do require invasive dental work, please contact me and we will stop the medication 3 months prior.      -The data available at this time suggests that there is probably a small increase risk of atypical (nontraumatic) subtrochanteric fractures of the femur in patients on bisphosphonate therapy compared to those not onit. One large study suggested that for every 100 fractures prevented with bisphosphonate therapy, less than one femur fracture  will occur. Other studies suggest one episode per 2,500 patient years. Patient should call withleg pain.     -Calcium: total calcium intake should be 1200mg per day from dietary sources. If not achieved with diet alone, add in calcium tablet(s). No more than 500mg at one time. Dietary sources: 8 ounces of milk,4 ounces of yogurt or 1 ounce of cheese contain about 300mg calcium per serving, green veggies, almonds, beans, oranges, along with various other plant sources.     -Vitamin D3 recommendations: 2000 IU daily.    -Bone Stimulating Exercise: impact and weight-bearing exercise like walking, jogging, yoga, Josué-Chi, stair-climbing, dancing, dry aerobics, and tennis 3-5 times per week for at least 30 minutes & weight-lifting orresistance training 2 times per week, may improve your bone desity and increase your strength, thereby decreasing your risk of fracture.    -Dr. Brigid Mcgee has a book on Yoga for Osteoporosis and a 12 pose regimenyou can look up online.     -Fall Prevention: avoiding ice, use of [employer] Tracks http://www.Snapguide/ to cover shoes in the winter, avoiding loose gravel and uneven terrain, clearing house of cords, throw rugs,avoiding ladders, using caution with stairs and around dogs and small children.

## 2025-02-04 NOTE — PROGRESS NOTES
Alma Delia Hsieh 50 yo female was seen today for management of osteoporosis.  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 She had only 2 periods in her life. She did smoke cigarettes for 20 years.    The last bone density scan was done on 12/27/24:  Lumbar spine   T-score -3.0 , BMD 0.815 g/cm2.      Left Femoral  neck  T-score -2.6 , BMD 0.680 g/cm2.  Left Total hip  T-score -2.1 , BMD 0.741 g/cm2.     Right Femoral neck  T-score -2.5, BMD  0.687 g/cm2.  Right Total hip  T-score -2.1 , BMD  0.748 g/cm2.    Patient was never treated in the past with osteoporosis medications. She was on HRT since age of 22 and she switched to identical hormones last year.    Social history:  reports that she quit smoking about 13 months ago. Her smoking use included cigarettes. She has a 3 pack-year smoking history. She has never used smokeless tobacco. She reports current alcohol use. She reports that she does not use drugs.    Past medical history:   Patient Active Problem List   Diagnosis    Premature ovarian failure    Trisomy X syndrome    Primary osteoarthritis of left knee    Iron deficiency     History of fractures: R wrist age 23, L wirst age 26. L knee bone spur age 46.    FH - her M-GM had hip fracture at age 98. Her GF had Paget's disease. Mother had breast cancer age 69       Family History   Problem Relation Age of Onset    Breast Cancer Mother     No Known Problems Brother     Heart Disease Maternal Grandmother     Hypertension Paternal Grandmother     Osteoporosis Paternal Grandmother     Glaucoma Paternal Grandfather     Macular Degeneration Paternal Grandfather     Cancer Paternal Grandfather         Skin    Cerebrovascular Disease Paternal Grandfather        Diet and supplements: Caltrate 1200 mg daily    Risk factors for osteoporosis/osteoporotic fracture:  Medications affecting bone health - no  Family history of osteoporosis/parental hip fracture - no  Kidney stones  "or hypercalcemia/hypercalciuria - no  Relevant gastrointestinal surgery or malabsorption - no  Weight loss - no  Tobacco use - yes  Alcohol use - no  Hypogonadism - no  Transplantation - no  Hyperthyroidism - no  Rheumatoid arthritis - no  Type 2 diabetes mellitus - no  Chronic Kidney disease - no  Other - no      Last blood work 4/2024 showed normal CMP, TSH, vit D level.      ROS:     Constitutional: Negative.    HENT: Negative.    Eyes: Negative.    Respiratory: Negative.    Cardiovascular: Negative.    Gastrointestinal: Negative.    Endocrine: Negative.    Genitourinary: Negative.    Musculoskeletal: Negative.    Skin: Negative.    Allergic/Immunologic: Negative.    Neurological: Negative.    Hematological: Negative.    Psychiatric/Behavioral: Negative.      PE:  /62   Pulse 69   Temp 97.1  F (36.2  C) (Temporal)   Resp 16   Ht 1.78 m (5' 10.08\")   Wt 87.2 kg (192 lb 4.8 oz)   LMP  (LMP Unknown)   SpO2 99%   BMI 27.53 kg/m    Constitutional:  oriented to person, place, and time, appears well-nourished. No distress.   HENT:           Impression:   (M81.8) Idiopathic osteoporosis  (primary encounter diagnosis)  Comment: Printed information was provided about all available medications on the market. We will discuss all on the follow-up visit.  Anabolic agents will be very beneficial since no contraindications and she has very low bone density.  If we start antiresorptive agent first, I would recommend Reclast infusion yearly and repeating DXA scan in 1 year.    Workup for secondary cause will be done today.  Plan: Bone specific alk phosphatase, CBC with         platelets, Comprehensive metabolic panel, TSH,         Tissue transglutaminase kat IgA and IgG,         Protein Electrophoresis with IELP Reflex, Elp         random UR reflex UIEP, Vitamin D Deficiency,         Parathyroid Hormone Intact, Magnesium,         Immunoglobulins A G and M, Kappa and lambda         light chain, C-PELOPEPTIDE (CTX), " URINE,         Calcium timed urine            (E28.39) Premature ovarian failure      (Q97.0) Trisomy X syndrome        Plan:  1. The patient will take 1200 - 1500 mg of calcium daily from the diet and supplements.  2. The patient will take 2000 IU of vit D daily.  3. Treatment options discussed with the patient   4. I am asking for follow up in few weeks.    The longitudinal plan of care for the diagnosis(es)/condition(s) as documented were addressed during this visit. Due to the added complexity in care, I will continue to support Alma Delia in the subsequent management and with ongoing continuity of care. .    Patient Instructions     Treatment Options discussed:   Bisphosphonates  Oral - Alendronate (Fosamax) once weekly for 2-5 years  IV - Zoledronic acid (Reclast) once a year for 3 years total     -Discussed that studies have shown that alendronate for 5 years will protect the bones for an additional 5 years nearly as much as being onalendronate for 10 years straight (only a slight increase in asymptomatic vertebral fractures, no increase in symptomatic fractures).     -discussed studies have shown that three years of zolendronic acid protects forthe following 3 years as much as being on zolendronic acid for 6 years straight      GI side effects of the oral bisphosphonates include reflux, esophagitis (inflammation of the esophagus) and ulcers. You should takethe medication first thing in the morning, on an empty stomach with a full glass of water and wait 60 minutes to eat.     Flu-like symptoms can occur within the first 24-72 hours of your first infusion of the IVbisphosphonate. This includes low-grade fever, muscle and joint pains. Ibuprofen and/or Tylenol can help these symptoms. Low calcium levels can also occur with the IV bisphosphonates.     Osteonecrosis of the jaw(ONJ) has been rarely associated with bisphosphonate use for osteoporosis.The risk is approximately 1/1700-1/100,000, with development most  likely related to invasive dental procedures (extractions, dental implants) and poordental hygiene. Be sure to continue regular dental visits and alert me and/or your dentist for any issues. If you do require invasive dental work, please contact me and we will stop the medication 3 months prior.      -The data available at this time suggests that there is probably a small increase risk of atypical (nontraumatic) subtrochanteric fractures of the femur in patients on bisphosphonate therapy compared to those not onit. One large study suggested that for every 100 fractures prevented with bisphosphonate therapy, less than one femur fracture will occur. Other studies suggest one episode per 2,500 patient years. Patient should call withleg pain.     -Calcium: total calcium intake should be 1200mg per day from dietary sources. If not achieved with diet alone, add in calcium tablet(s). No more than 500mg at one time. Dietary sources: 8 ounces of milk,4 ounces of yogurt or 1 ounce of cheese contain about 300mg calcium per serving, green veggies, almonds, beans, oranges, along with various other plant sources.     -Vitamin D3 recommendations: 2000 IU daily.    -Bone Stimulating Exercise: impact and weight-bearing exercise like walking, jogging, yoga, Josué-Chi, stair-climbing, dancing, dry aerobics, and tennis 3-5 times per week for at least 30 minutes & weight-lifting orresistance training 2 times per week, may improve your bone desity and increase your strength, thereby decreasing your risk of fracture.    -Dr. Brigid Mcgee has a book on Yoga for Osteoporosis and a 12 pose regimenyou can look up online.     -Fall Prevention: avoiding ice, use of Yak Tracks http://www.[employer]/ to cover shoes in the winter, avoiding loose gravel and uneven terrain, clearing house of cords, throw rugs,avoiding ladders, using caution with stairs and around dogs and small children.           Thank you for the opportunity to participate in the  care of your patient. If you have any additional questions, do not hesitate to contact me at [facility].    This note has been dictated using voice recognition software. Any grammatical or context distortions are unintentional and inherent to the software      With best personal regards,   Houston Abreu MD, MD, CCD  2/4/2025

## 2025-02-05 LAB
ALBUMIN SERPL BCG-MCNC: 4.4 G/DL (ref 3.5–5.2)
ALP SERPL-CCNC: 86 U/L (ref 40–150)
ALT SERPL W P-5'-P-CCNC: 22 U/L (ref 0–50)
ANION GAP SERPL CALCULATED.3IONS-SCNC: 13 MMOL/L (ref 7–15)
AST SERPL W P-5'-P-CCNC: 21 U/L (ref 0–45)
BILIRUB SERPL-MCNC: 0.2 MG/DL
BUN SERPL-MCNC: 17.6 MG/DL (ref 6–20)
CALCIUM SERPL-MCNC: 9.5 MG/DL (ref 8.8–10.4)
CHLORIDE SERPL-SCNC: 103 MMOL/L (ref 98–107)
CREAT SERPL-MCNC: 0.99 MG/DL (ref 0.51–0.95)
EGFRCR SERPLBLD CKD-EPI 2021: 70 ML/MIN/1.73M2
GLUCOSE SERPL-MCNC: 97 MG/DL (ref 70–99)
HCO3 SERPL-SCNC: 23 MMOL/L (ref 22–29)
IGA SERPL-MCNC: 71 MG/DL (ref 84–499)
IGG SERPL-MCNC: 807 MG/DL (ref 610–1616)
IGM SERPL-MCNC: 264 MG/DL (ref 35–242)
KAPPA LC FREE SER-MCNC: 1.58 MG/DL (ref 0.33–1.94)
KAPPA LC FREE/LAMBDA FREE SER NEPH: 0.97 {RATIO} (ref 0.26–1.65)
LAMBDA LC FREE SERPL-MCNC: 1.63 MG/DL (ref 0.57–2.63)
MAGNESIUM SERPL-MCNC: 2.1 MG/DL (ref 1.7–2.3)
POTASSIUM SERPL-SCNC: 4.1 MMOL/L (ref 3.4–5.3)
PROT SERPL-MCNC: 7.1 G/DL (ref 6.4–8.3)
PTH-INTACT SERPL-MCNC: 19 PG/ML (ref 15–65)
SODIUM SERPL-SCNC: 139 MMOL/L (ref 135–145)
TSH SERPL DL<=0.005 MIU/L-ACNC: 1.87 UIU/ML (ref 0.3–4.2)
TTG IGA SER-ACNC: <0.2 U/ML
TTG IGG SER-ACNC: 0.6 U/ML
VIT D+METAB SERPL-MCNC: 38 NG/ML (ref 20–50)

## 2025-02-06 LAB
ALBUMIN SERPL ELPH-MCNC: 4.2 G/DL (ref 3.7–5.1)
ALP BONE SERPL-MCNC: 14.7 UG/L
ALPHA1 GLOB SERPL ELPH-MCNC: 0.3 G/DL (ref 0.2–0.4)
ALPHA2 GLOB SERPL ELPH-MCNC: 0.7 G/DL (ref 0.5–0.9)
B-GLOBULIN SERPL ELPH-MCNC: 0.7 G/DL (ref 0.6–1)
GAMMA GLOB SERPL ELPH-MCNC: 0.9 G/DL (ref 0.7–1.6)
M PROTEIN SERPL ELPH-MCNC: 0 G/DL
PROT PATTERN SERPL ELPH-IMP: NORMAL

## 2025-03-04 ENCOUNTER — OFFICE VISIT (OUTPATIENT)
Dept: INTERNAL MEDICINE | Facility: CLINIC | Age: 50
End: 2025-03-04
Payer: COMMERCIAL

## 2025-03-04 VITALS
SYSTOLIC BLOOD PRESSURE: 100 MMHG | OXYGEN SATURATION: 97 % | DIASTOLIC BLOOD PRESSURE: 62 MMHG | TEMPERATURE: 96.9 F | RESPIRATION RATE: 16 BRPM | BODY MASS INDEX: 25.78 KG/M2 | HEART RATE: 64 BPM | HEIGHT: 70 IN | WEIGHT: 180.1 LBS

## 2025-03-04 DIAGNOSIS — E28.39 PREMATURE OVARIAN FAILURE: ICD-10-CM

## 2025-03-04 DIAGNOSIS — M81.8 IDIOPATHIC OSTEOPOROSIS: Primary | ICD-10-CM

## 2025-03-04 DIAGNOSIS — Q97.0 TRISOMY X SYNDROME: ICD-10-CM

## 2025-03-04 DIAGNOSIS — Z87.81 HISTORY OF WRIST FRACTURE: ICD-10-CM

## 2025-03-04 PROCEDURE — 3078F DIAST BP <80 MM HG: CPT | Performed by: INTERNAL MEDICINE

## 2025-03-04 PROCEDURE — 99214 OFFICE O/P EST MOD 30 MIN: CPT | Performed by: INTERNAL MEDICINE

## 2025-03-04 PROCEDURE — 3074F SYST BP LT 130 MM HG: CPT | Performed by: INTERNAL MEDICINE

## 2025-03-04 PROCEDURE — G2211 COMPLEX E/M VISIT ADD ON: HCPCS | Performed by: INTERNAL MEDICINE

## 2025-03-04 NOTE — PROGRESS NOTES
"  Assessment & Plan     Idiopathic osteoporosis    - DX Bone Density; Future    Premature ovarian failure      Trisomy X syndrome      History of wrist fracture      Alma Delia Jimenezoren 48 yo female was seen today for management of osteoporosis.  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 She had only 2 periods in her life. She did smoke cigarettes for 20 years.     The last bone density scan was done on 12/27/24:  Lumbar spine   T-score -3.0 , BMD 0.815 g/cm2.      Left Femoral  neck  T-score -2.6 , BMD 0.680 g/cm2.  Left Total hip  T-score -2.1 , BMD 0.741 g/cm2.     Right Femoral neck  T-score -2.5, BMD  0.687 g/cm2.  Right Total hip  T-score -2.1 , BMD  0.748 g/cm2.     Patient was never treated in the past with osteoporosis medications. She was on HRT since age of 22 and she switched to bioidentical hormones in Oct 2024.     History of fractures: R wrist age 23, L wirst age 26. L knee bone spur age 46.     FH - her M-GM had hip fracture at age 98. Her GF had Paget's disease. Mother had breast cancer age 69    Workup for secondary causes for osteoporosis was negative. She will do 24h urine calcium.    We discussed all results and available medications on the market.  She would like to do the HRT pallets for few years and then discuss other treatment options.    She will schedule DXA in dec 2025.  We will do the physical and osteoporosis follow-up in jan 2026.    The longitudinal plan of care for the diagnosis(es)/condition(s) as documented were addressed during this visit. Due to the added complexity in care, I will continue to support Alma Delia in the subsequent management and with ongoing continuity of care.  BMI  Estimated body mass index is 25.78 kg/m  as calculated from the following:    Height as of this encounter: 1.78 m (5' 10.08\").    Weight as of this encounter: 81.7 kg (180 lb 1.6 oz).             Violet Flannery is a 49 year old, presenting for the following " "health issues:  Follow Up (F/U)      3/4/2025     4:25 PM   Additional Questions   Roomed by Jennifer     History of Present Illness       Reason for visit:  Osteoporosis    She eats 2-3 servings of fruits and vegetables daily.She consumes 0 sweetened beverage(s) daily.She exercises with enough effort to increase her heart rate 30 to 60 minutes per day.  She exercises with enough effort to increase her heart rate 4 days per week.   She is taking medications regularly.                      Objective    /62   Pulse 64   Temp 96.9  F (36.1  C) (Temporal)   Resp 16   Ht 1.78 m (5' 10.08\")   Wt 81.7 kg (180 lb 1.6 oz)   LMP  (LMP Unknown)   SpO2 97%   BMI 25.78 kg/m    Body mass index is 25.78 kg/m .  Physical Exam               Signed Electronically by: Houston Abreu MD    "

## 2025-03-04 NOTE — PATIENT INSTRUCTIONS
Schedule DXA scan at [facility] in Dec 2025.  I will see you in January 2026 for physical and DXA, etc.    24 h urine calcium today.

## 2025-03-13 ENCOUNTER — TELEPHONE (OUTPATIENT)
Dept: GASTROENTEROLOGY | Facility: CLINIC | Age: 50
End: 2025-03-13

## 2025-03-13 NOTE — TELEPHONE ENCOUNTER
Writer called the patient to do her pre assessment for her upcoming procedure. She states that she would like to cancel her colonoscopy because she does not have a ride. She will call back after she speaks with the person giving her a ride to see what works for them.    A message was sent to endoscopy scheduling to cancel this patient.    Melyssa Bunn LPN  Endoscopy Procedure Pre Assessment   [phone] option 2

## 2025-03-13 NOTE — TELEPHONE ENCOUNTER
Pre visit planning completed.      Procedure details:    Patient scheduled for Colonoscopy on 3/28/25.     Arrival time: 0700. Procedure time 0800    Facility location: [facility]; [address] [place] [address], [address] Floor, [place] [address]. Check in location: 5th Floor.    Sedation type: Conscious sedation     Pre op exam needed? No.    Indication for procedure: screening colonoscopy       Chart review:     Electronic implanted devices? No    Recent diagnosis of diverticulitis within the last 6 weeks? No      Medication review:    Diabetic? No    Anticoagulants? No    Weight loss medication/injectable? Yes. Tirzepatide-Weight Management (Zepbound). Weekly dosing of medication.  HOLD 7 days before procedure.  Follow up with managing provider. - patient answered no to schedulers screening question about this medication, verify if she is taking at this time. Should be off of the medication x 30 days before she can have the standard miralax prep.     Other medication HOLDING recommendations:  Ferrous sulfate (iron supplements): HOLD 7 days before procedure.      Prep for procedure:     Bowel prep recommendation: Standard Miralax.   Due to: standard bowel prep    Can only have standard miralax if she has not taken zepbound or a different GLP-1 in the last 30 days.     Procedure information and instructions sent via [employer], send instructions after verifying GLP-1 medication use.          Kim Brambila RN  Endoscopy Procedure Pre Assessment   [phone] option 3

## 2025-04-01 ENCOUNTER — LAB (OUTPATIENT)
Dept: LAB | Facility: CLINIC | Age: 50
End: 2025-04-01
Payer: COMMERCIAL

## 2025-04-01 DIAGNOSIS — N95.9 MENOPAUSAL AND POSTMENOPAUSAL DISORDER: ICD-10-CM

## 2025-04-01 LAB
ESTRADIOL SERPL-MCNC: 66 PG/ML
FSH SERPL IRP2-ACNC: 28.6 MIU/ML

## 2025-04-01 PROCEDURE — 82670 ASSAY OF TOTAL ESTRADIOL: CPT | Performed by: PHYSICIAN ASSISTANT

## 2025-04-01 PROCEDURE — 83001 ASSAY OF GONADOTROPIN (FSH): CPT | Performed by: PHYSICIAN ASSISTANT

## 2025-04-01 PROCEDURE — 99000 SPECIMEN HANDLING OFFICE-LAB: CPT | Performed by: PATHOLOGY

## 2025-04-01 PROCEDURE — 36415 COLL VENOUS BLD VENIPUNCTURE: CPT | Performed by: PATHOLOGY

## 2025-04-01 PROCEDURE — 84403 ASSAY OF TOTAL TESTOSTERONE: CPT | Performed by: PHYSICIAN ASSISTANT

## 2025-04-08 LAB — TESTOST SERPL-MCNC: 224 NG/DL (ref 8–60)

## 2025-06-02 ENCOUNTER — LAB (OUTPATIENT)
Dept: LAB | Facility: CLINIC | Age: 50
End: 2025-06-02
Payer: COMMERCIAL

## 2025-06-02 DIAGNOSIS — Z13.71 ENCOUNTER FOR NONPROCREATIVE GENETIC COUNSELING AND TESTING: ICD-10-CM

## 2025-06-02 DIAGNOSIS — Z71.83 ENCOUNTER FOR NONPROCREATIVE GENETIC COUNSELING AND TESTING: ICD-10-CM

## 2025-06-02 DIAGNOSIS — N95.9 MENOPAUSAL AND POSTMENOPAUSAL DISORDER: ICD-10-CM

## 2025-06-02 LAB
ESTRADIOL SERPL-MCNC: 69 PG/ML
FSH SERPL IRP2-ACNC: 17.3 MIU/ML

## 2025-06-02 PROCEDURE — 99000 SPECIMEN HANDLING OFFICE-LAB: CPT | Performed by: PATHOLOGY

## 2025-06-02 PROCEDURE — 36415 COLL VENOUS BLD VENIPUNCTURE: CPT | Performed by: PATHOLOGY

## 2025-06-02 PROCEDURE — 82670 ASSAY OF TOTAL ESTRADIOL: CPT | Performed by: PHYSICIAN ASSISTANT

## 2025-06-02 PROCEDURE — 83001 ASSAY OF GONADOTROPIN (FSH): CPT | Performed by: PHYSICIAN ASSISTANT

## 2025-06-02 PROCEDURE — 84403 ASSAY OF TOTAL TESTOSTERONE: CPT | Performed by: PHYSICIAN ASSISTANT

## 2025-06-03 LAB
LAB ORDER RESULT STATUS: NORMAL
Lab: NORMAL
PERFORMING LABORATORY: NORMAL
TEST NAME: NORMAL

## 2025-06-04 LAB — TESTOST SERPL-MCNC: 112 NG/DL (ref 8–60)

## 2025-06-11 ENCOUNTER — TELEPHONE (OUTPATIENT)
Dept: CONSULT | Facility: CLINIC | Age: 50
End: 2025-06-11
Payer: COMMERCIAL

## 2025-06-11 LAB
SCANNED LAB RESULT: ABNORMAL
TEST NAME: ABNORMAL

## 2025-06-11 NOTE — TELEPHONE ENCOUNTER
Reason for Call  I called Alma Delia to discuss the results of her genetic testing.    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 The BTD gene is associated with biotinidase deficiency. The HFE gene is associated with hereditary hemochromatosis. Both of these conditions are inherited in an autosomal recessive manner. This means that both copies of a person's gene need to have pathogenic variants in them in order for a person to show symptoms of the condition. Individuals who only have one pathogenic variant, such as Alma Delia, are called carriers. Carriers of biotinidase deficiency and hemochromatosis generally do not show signs or symptoms of the condition. If two individuals are both carriers for the same autosomal recessive condition, each child between the couple has a 25% chance of being affected, a 50% chance of being a carrier, and a 25% chance of being unaffected and not a carrier.     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 Other family members have a chance of being carriers of these variants and can consider their own genetic testing to better understand their reproductive risks.     No other disease-causing changes were identified in the 163 genes analyzed, including the OTC gene. This result rules out many medically actionable genetic conditions for Alma Delia, including many inherited cancer-risk conditions, heart concerns, and metabolic diseases such as OTC  deficiency. However, it is still possible that Alma Delia could have a genetic condition not analyzed by this particular test. Additional genetic testing could be considered if Alma Delia were to develop symptoms of a genetic condition.      Follow-Up  We are still waiting on the outcome of the prior authorization for Alma Delia's primary ovarian insufficiency panel. Alma Delia will be contacted by the PA team when that is complete. Alma Delia did not have additional questions at this time, but she is encouraged to reach out to me if questions come up. I will send a copy of the report to Alma Delia for her own records through the [employer] portal.      Meaghan Rivers MS, [facility]  Genetic Counselor  [facility]  Phone: [phone]

## 2025-07-10 ENCOUNTER — LAB (OUTPATIENT)
Dept: LAB | Facility: CLINIC | Age: 50
End: 2025-07-10
Payer: COMMERCIAL

## 2025-07-10 DIAGNOSIS — Z13.71 ENCOUNTER FOR NONPROCREATIVE GENETIC COUNSELING AND TESTING: ICD-10-CM

## 2025-07-10 DIAGNOSIS — N95.9 MENOPAUSAL AND POSTMENOPAUSAL DISORDER: ICD-10-CM

## 2025-07-10 DIAGNOSIS — Z71.83 ENCOUNTER FOR NONPROCREATIVE GENETIC COUNSELING AND TESTING: ICD-10-CM

## 2025-07-10 LAB
ESTRADIOL SERPL-MCNC: 69 PG/ML
FSH SERPL IRP2-ACNC: 24.4 MIU/ML

## 2025-07-10 PROCEDURE — 84403 ASSAY OF TOTAL TESTOSTERONE: CPT | Performed by: PHYSICIAN ASSISTANT

## 2025-07-10 PROCEDURE — 99000 SPECIMEN HANDLING OFFICE-LAB: CPT | Performed by: PATHOLOGY

## 2025-07-10 PROCEDURE — 82670 ASSAY OF TOTAL ESTRADIOL: CPT | Performed by: PHYSICIAN ASSISTANT

## 2025-07-10 PROCEDURE — 83001 ASSAY OF GONADOTROPIN (FSH): CPT | Performed by: PHYSICIAN ASSISTANT

## 2025-07-13 LAB — TESTOST SERPL-MCNC: 83 NG/DL (ref 8–60)

## 2025-07-16 LAB — INTERPRETATION SERPL IEP-IMP: NORMAL

## 2025-07-23 ENCOUNTER — ANCILLARY PROCEDURE (OUTPATIENT)
Dept: ULTRASOUND IMAGING | Facility: CLINIC | Age: 50
End: 2025-07-23
Attending: PHYSICIAN ASSISTANT
Payer: COMMERCIAL

## 2025-07-23 DIAGNOSIS — N93.9 ABNORMAL UTERINE BLEEDING: ICD-10-CM

## 2025-07-23 PROCEDURE — 76830 TRANSVAGINAL US NON-OB: CPT | Performed by: RADIOLOGY

## 2025-07-23 PROCEDURE — 76856 US EXAM PELVIC COMPLETE: CPT | Performed by: RADIOLOGY

## 2025-08-19 ENCOUNTER — LAB (OUTPATIENT)
Dept: LAB | Facility: CLINIC | Age: 50
End: 2025-08-19
Payer: COMMERCIAL

## 2025-08-19 DIAGNOSIS — N95.9 MENOPAUSAL AND POSTMENOPAUSAL DISORDER: ICD-10-CM

## 2025-08-19 DIAGNOSIS — N95.1 SYMPTOMATIC MENOPAUSAL OR FEMALE CLIMACTERIC STATES: ICD-10-CM

## 2025-08-19 LAB
ESTRADIOL SERPL-MCNC: 51 PG/ML
FSH SERPL IRP2-ACNC: 11.2 MIU/ML
PROGEST SERPL-MCNC: 31.4 NG/ML

## 2025-08-19 PROCEDURE — 84144 ASSAY OF PROGESTERONE: CPT | Performed by: PHYSICIAN ASSISTANT

## 2025-08-19 PROCEDURE — 99000 SPECIMEN HANDLING OFFICE-LAB: CPT | Performed by: PATHOLOGY

## 2025-08-19 PROCEDURE — 82670 ASSAY OF TOTAL ESTRADIOL: CPT | Performed by: PHYSICIAN ASSISTANT

## 2025-08-19 PROCEDURE — 83001 ASSAY OF GONADOTROPIN (FSH): CPT | Performed by: PHYSICIAN ASSISTANT

## 2025-08-19 PROCEDURE — 84403 ASSAY OF TOTAL TESTOSTERONE: CPT | Performed by: PHYSICIAN ASSISTANT

## 2025-08-19 PROCEDURE — 36415 COLL VENOUS BLD VENIPUNCTURE: CPT | Performed by: PATHOLOGY

## 2025-08-21 LAB — TESTOST SERPL-MCNC: 49 NG/DL (ref 8–60)

## 2025-08-26 ENCOUNTER — DOCUMENTATION ONLY (OUTPATIENT)
Dept: LAB | Facility: CLINIC | Age: 50
End: 2025-08-26
Payer: COMMERCIAL

## 2025-09-01 ENCOUNTER — PATIENT OUTREACH (OUTPATIENT)
Dept: CARE COORDINATION | Facility: CLINIC | Age: 50
End: 2025-09-01
Payer: COMMERCIAL

## (undated) RX ORDER — LIDOCAINE HYDROCHLORIDE 10 MG/ML
INJECTION, SOLUTION EPIDURAL; INFILTRATION; INTRACAUDAL; PERINEURAL
Status: DISPENSED
Start: 2022-01-03

## (undated) RX ORDER — TRIAMCINOLONE ACETONIDE 40 MG/ML
INJECTION, SUSPENSION INTRA-ARTICULAR; INTRAMUSCULAR
Status: DISPENSED
Start: 2022-01-03